# Patient Record
Sex: FEMALE | Race: WHITE | NOT HISPANIC OR LATINO | Employment: UNEMPLOYED | URBAN - NONMETROPOLITAN AREA
[De-identification: names, ages, dates, MRNs, and addresses within clinical notes are randomized per-mention and may not be internally consistent; named-entity substitution may affect disease eponyms.]

---

## 2020-09-09 ENCOUNTER — TELEPHONE (OUTPATIENT)
Dept: NEUROSURGERY | Facility: CLINIC | Age: 45
End: 2020-09-09

## 2020-09-09 NOTE — TELEPHONE ENCOUNTER
Called patient to remind her of her apt with Gabriel on 9/14/20. I left a vm with her and spoke with her emergency contact. I advised patient to bring cd of testing done at Dr. Muro office.

## 2020-09-14 ENCOUNTER — HOSPITAL ENCOUNTER (OUTPATIENT)
Dept: GENERAL RADIOLOGY | Facility: HOSPITAL | Age: 45
Discharge: HOME | End: 2020-09-14

## 2020-09-14 ENCOUNTER — OFFICE VISIT (OUTPATIENT)
Dept: NEUROSURGERY | Facility: CLINIC | Age: 45
End: 2020-09-14

## 2020-09-14 VITALS — WEIGHT: 285 LBS | HEIGHT: 68 IN | BODY MASS INDEX: 43.19 KG/M2

## 2020-09-14 DIAGNOSIS — M79.605 PAIN OF LEFT LOWER EXTREMITY: ICD-10-CM

## 2020-09-14 DIAGNOSIS — R20.2 NUMBNESS AND TINGLING OF LEFT LOWER EXTREMITY: ICD-10-CM

## 2020-09-14 DIAGNOSIS — E66.01 CLASS 3 SEVERE OBESITY DUE TO EXCESS CALORIES WITH SERIOUS COMORBIDITY AND BODY MASS INDEX (BMI) OF 40.0 TO 44.9 IN ADULT (HCC): ICD-10-CM

## 2020-09-14 DIAGNOSIS — M41.20 SCOLIOSIS (AND KYPHOSCOLIOSIS), IDIOPATHIC: ICD-10-CM

## 2020-09-14 DIAGNOSIS — M54.50 LUMBAR BACK PAIN: ICD-10-CM

## 2020-09-14 DIAGNOSIS — M41.125 ADOLESCENT IDIOPATHIC SCOLIOSIS OF THORACOLUMBAR REGION: Primary | ICD-10-CM

## 2020-09-14 DIAGNOSIS — R20.0 NUMBNESS AND TINGLING OF LEFT LOWER EXTREMITY: ICD-10-CM

## 2020-09-14 PROBLEM — E66.813 CLASS 3 SEVERE OBESITY DUE TO EXCESS CALORIES WITHOUT SERIOUS COMORBIDITY WITH BODY MASS INDEX (BMI) OF 40.0 TO 44.9 IN ADULT: Status: ACTIVE | Noted: 2020-09-14

## 2020-09-14 PROCEDURE — 99204 OFFICE O/P NEW MOD 45 MIN: CPT | Performed by: NURSE PRACTITIONER

## 2020-09-14 PROCEDURE — 72082 X-RAY EXAM ENTIRE SPI 2/3 VW: CPT

## 2020-09-14 PROCEDURE — 72114 X-RAY EXAM L-S SPINE BENDING: CPT

## 2020-09-14 RX ORDER — MELOXICAM 15 MG/1
15 TABLET ORAL DAILY
COMMUNITY
End: 2020-12-30

## 2020-09-14 RX ORDER — FLUTICASONE PROPIONATE 50 MCG
2 SPRAY, SUSPENSION (ML) NASAL DAILY PRN
COMMUNITY

## 2020-09-14 RX ORDER — HYDROCODONE BITARTRATE AND ACETAMINOPHEN 7.5; 325 MG/1; MG/1
TABLET ORAL EVERY 8 HOURS PRN
COMMUNITY
End: 2023-03-01

## 2020-09-14 NOTE — PROGRESS NOTES
Primary Care Provider: Jenniffer Reddy APRN  Requesting Provider: PHILIP Muro MD    Chief Complaint:   Chief Complaint   Patient presents with   • Back Pain     Patient is here today for back pain, left leg pain with left leg numbness and tingling into the left foot. Patient brought cd for Gabriel to review.     History of Present Illness  Consultation today at the request of PHILIP Muro MD    HPI:  Sara Foy is a 45 y.o. female who presents today with with a complaint of lumbar back and left leg pain, 90% back, 10% legs.  Diagnosed with scoliosis at age 20.  No previous spine surgeries.  No known injury.    Gradual and progressive onset of lumbar back pain since 2018.  Ms. Foy currently complains of constant left thoracolumbar back pain that intermittently radiates down the posterior aspect of the left leg to the foot, as well as intermittent numbness and tingling to the lateral leg that extends to include digits 3-5 for the left foot.  She states her back discomfort worsens with prolonged sitting, standing, walking, and with twisting.  Alleviating factors include lying in a right lateral recumbent position, forward flexion, as well as hydrocodone, Mobic, and lumbar facet injections which she obtains from Dr. Borges.  Ms. Foy denies fevers, chills, night sweats, unexplained weight loss, saddle anesthesia, or bowel or bladder dysfunction.  She currently rates the severity of her symptoms 7/10.  No additional concerns at this time.    Ms. Foy has not completed nor participated in a dedicated course of physician directed physical therapy or massage care.  Routinely evaluated by chiropractic care as well as by Dr. Borges with pain management.  Last set of injections was performed on 8/20/2020.    Oswestry Disability Index = 62%   Score   Pain Intensity Fairly severe pain-3   Personal Care I need some help but manage most of my personal care-3   Lifting Only very light weights-4   Walking Pain  prevents > 100 yards-3   Sitting Pain prevents sitting > 10 min-4   Standing Pain limits standing to < 10 min-4   Sleeping Can only sleep < 6 hrs-2   Sex Life (if applicable) Sex severely restricted by pain-4   Social Life I do not go out as often because of pain-3   Traveling Pain is bad but trips over 2 hrs-2   (Aleisha et al, 1980)    SCORE INTERPRETATION OF THE OSWESTRY LBP DISABILITY QUESTIONNAIRE     0-20% Minimal disability Can cope with most ADLs. Usually no treatment is needed, apart from advice on lifting, sitting, posture, physical fitness, and diet. In this group,  some patients have particular difficulty with sitting and this may be important if their occupation is sedentary (, , etc.)       20-40% Moderate disability This group experiences more pain and problems with sitting, lifting, and standing. Travel and social life are more difficult and they may well be off  work. Personal care, sexual activity, and sleeping are not grossly affected, and the back condition can usually be managed by conservative means.       40-60% Severe disability Pain remains the main problem in this group of patients, but travel, personal care, social life, sexual activity, and sleep are also affected.  These patients require detailed investigation.     60-80% Crippled Back pain impinges on all aspects of these patients’ lives both at home and at work. Positive intervention is required.     % These patients are either bed-bound or exaggerating their symptoms. This can be evaluated by careful observation of the patient during the  medical examination.    ROS  Review of Systems   Constitutional: Positive for activity change and fatigue.   HENT: Positive for ear pain, sinus pain and tinnitus.    Eyes: Negative.    Respiratory: Negative.    Cardiovascular: Positive for leg swelling.   Gastrointestinal: Positive for nausea.   Endocrine: Positive for cold intolerance and heat intolerance.   Genitourinary:  "Negative.    Musculoskeletal: Positive for back pain, joint swelling, neck pain and neck stiffness.   Skin: Negative.    Allergic/Immunologic: Negative.    Neurological: Positive for light-headedness, numbness and headaches.   Hematological: Negative.    Psychiatric/Behavioral: The patient is nervous/anxious.    All other systems reviewed and are negative.    Past Medical History:   Diagnosis Date   • Chronic pain    • Scoliosis      Past Surgical History:   Procedure Laterality Date   • HYSTERECTOMY  2016     Family History: family history is not on file.    Social History:  reports that she has never smoked. She has never used smokeless tobacco. She reports previous alcohol use. She reports that she does not use drugs.    Medications:    Current Outpatient Medications:   •  fluticasone (FLONASE) 50 MCG/ACT nasal spray, fluticasone propionate 50 mcg/actuation nasal spray,suspension  SHAKE LQ AND U 1 SPR IEN QD, Disp: , Rfl:   •  HYDROcodone-acetaminophen (NORCO) 7.5-325 MG per tablet, Every 8 (Eight) Hours., Disp: , Rfl:   •  meloxicam (MOBIC) 15 MG tablet, Take 15 mg by mouth Daily., Disp: , Rfl:     Allergies:  Penicillins and Contrast dye    Objective   Ht 172.7 cm (68\")   Wt 129 kg (285 lb)   BMI 43.33 kg/m²   Physical Exam  Vitals signs and nursing note reviewed.   Constitutional:       General: She is not in acute distress.     Appearance: Normal appearance. She is obese. She is not ill-appearing, toxic-appearing or diaphoretic.      Comments: BMI 43.33   HENT:      Head: Normocephalic and atraumatic.      Right Ear: Hearing normal.      Left Ear: Hearing normal.      Nose: Nose normal.      Mouth/Throat:      Lips: Pink. No lesions.      Mouth: Mucous membranes are moist.      Pharynx: No oropharyngeal exudate or posterior oropharyngeal erythema.   Eyes:      General: Lids are normal.      Extraocular Movements: Extraocular movements intact and EOM normal.      Conjunctiva/sclera: Conjunctivae normal.      " Pupils: Pupils are equal, round, and reactive to light.   Neck:      Musculoskeletal: Full passive range of motion without pain, normal range of motion and neck supple.   Cardiovascular:      Rate and Rhythm: Normal rate and regular rhythm.      Pulses: Normal pulses.   Pulmonary:      Effort: Pulmonary effort is normal.   Abdominal:      General: Abdomen is flat. There is no distension. There are no signs of injury.      Tenderness: There is no right CVA tenderness.   Musculoskeletal: Normal range of motion.      Right lower leg: No edema.      Left lower leg: No edema.   Skin:     General: Skin is warm and dry.      Capillary Refill: Capillary refill takes less than 2 seconds.   Neurological:      General: No focal deficit present.      Mental Status: She is alert and oriented to person, place, and time.      GCS: GCS eye subscore is 4. GCS verbal subscore is 5. GCS motor subscore is 6.      Gait: Gait is intact.      Deep Tendon Reflexes:      Reflex Scores:       Tricep reflexes are 2+ on the right side and 2+ on the left side.       Bicep reflexes are 2+ on the right side and 2+ on the left side.       Brachioradialis reflexes are 2+ on the right side and 2+ on the left side.       Patellar reflexes are 2+ on the right side and 2+ on the left side.       Achilles reflexes are 2+ on the right side and 2+ on the left side.  Psychiatric:         Attention and Perception: Attention normal.         Mood and Affect: Mood normal.         Speech: Speech normal.         Behavior: Behavior normal. Behavior is cooperative.         Thought Content: Thought content normal.       Neurologic Exam     Mental Status   Oriented to person, place, and time.   Attention: normal. Concentration: normal.   Speech: speech is normal   Level of consciousness: alert    Cranial Nerves     CN II   Visual fields full to confrontation.     CN III, IV, VI   Pupils are equal, round, and reactive to light.  Extraocular motions are normal.     CN  V   Facial sensation intact.     CN VII   Facial expression full, symmetric.     CN VIII   CN VIII normal.     CN IX, X   CN IX normal.     CN XI   CN XI normal.     Motor Exam   Muscle bulk: normal  Overall muscle tone: normal  Right arm tone: normal  Left arm tone: normal  Right arm pronator drift: absent  Left arm pronator drift: absent  Right leg tone: normal  Left leg tone: normal    Strength   Right deltoid: 5/5  Left deltoid: 5/5  Right biceps: 5/5  Left biceps: 5/5  Right triceps: 5/5  Left triceps: 5/5  Right wrist extension: 5/5  Left wrist extension: 5/5  Right iliopsoas: 5/5  Left iliopsoas: 5/5  Right quadriceps: 5/5  Left quadriceps: 5/5  Right anterior tibial: 5/5  Left anterior tibial: 5/5  Right posterior tibial: 5/5  Left posterior tibial: 5/5    Sensory Exam   Light touch normal.     Gait, Coordination, and Reflexes     Gait  Gait: normal    Tremor   Resting tremor: absent  Intention tremor: absent  Action tremor: absent    Reflexes   Right brachioradialis: 2+  Left brachioradialis: 2+  Right biceps: 2+  Left biceps: 2+  Right triceps: 2+  Left triceps: 2+  Right patellar: 2+  Left patellar: 2+  Right achilles: 2+  Left achilles: 2+  Right : 4+  Left : 4+  Right plantar: normal  Left plantar: normal  Right Rankin: absent  Left Rankin: absent  Right ankle clonus: absent  Left ankle clonus: absent  Right pendular knee jerk: absent  Left pendular knee jerk: absent    Imaging: (independent review and interpretation)  6/15/2020.  X-rays of the cervical spine show reversal of the normal cervical lordosis, no acute fractures or listhesis, multilevel degenerative changes.      6/18/2020 MRI of the cervical spine shows a subtle reversal of the normal cervical lordosis, no acute fractures, listhesis, or T2 signal change within the central cord, multilevel degenerative changes resulting in thecal sac compression that appears worse at C5-6; no significant central canal or foraminal stenosis noted  within the cervical spine.      8/24/2020       ASSESSMENT and PLAN  Sara Foy is a 45 y.o. female with a significant medical history of untreated idiopathic scoliosis and morbid obesity.  She presents with a new problem of left thoracolumbar back and left leg pain in the L5 distribution, 90% back, 10% left leg. DERRICK: 62.  Physical exam findings of BMI 43.33, otherwise neurologically intact.  Her imaging: MRI of the cervical spine shows multilevel degenerative changes resulting in areas of thecal sac compression without significant central canal or foraminal stenosis.  AP scoliosis imaging shows a single major thoracolumbar curvature to the left.    TREATMENT RECOMMENDATIONS ...  Idiopathic scoliosis  For further evaluation, we will proceed today by obtaining AP and lateral scoliosis imaging.    Lumbar back and left leg pain  Intermittent numbness and tingling to the left lower extremity  Differential diagnosis include, but are not limited to spondylolisthesis with concern for mechanical instability, degenerative facet arthropathy, degenerative lumbar stenosis, lumbar stenosis with neurogenic claudication and Lumbar stenosis with Left lower extremity radiculopathy.     For further evaluation will proceed today by obtaining x-rays of the lumbar spine complete with flexion-extension to assess for areas of instability.  As a means of first-line conservative management for lumbar back pain we will proceed by sending Ms. Foy for a dedicated course of physician directed physical therapy, prescription provided to patient.  I recommended she continue chiropractic and/or massage care as tolerated.  I additionally recommended she continue her current pain medication regimen per Dr. Borges.  B/R/AE and use discussed.  Return for reassessment after completion of physical therapy.  I advised the patient to call to return sooner for new or worsening complaints of weakness, paresthesias, gait disturbances, or any additional  concerns.  Treatment options discussed in detail with Sara and she voiced understanding.  Ms. Foy agrees with this plan of care.    Obese Class III extreme obesity: > or equal to 40kg/m2  Body mass index is 43.33 kg/m².  Information on the DASH diet provided in the AVS.  We will continue to provided diet and exercise information with the goal of weight loss at each scheduled appointment.  Additionally, we will send Ms. Foy to bariatric surgeon, Dr. Caraballo, for further evaluation and care of weight loss management.    Sara was seen today for back pain.    Diagnoses and all orders for this visit:    Adolescent idiopathic scoliosis of thoracolumbar region  -     XR spine scoliosis 2 or 3 views; Future    Lumbar back pain  -     XR Spine Lumbar Complete With Flex & Ext; Future  -     Ambulatory Referral to Physical Therapy Evaluate and treat (3X A WEEK FOR 6 WEEKS); Stretching, ROM, Strengthening    Pain of left lower extremity    Numbness and tingling of left lower extremity    Class 3 severe obesity due to excess calories with serious comorbidity and body mass index (BMI) of 40.0 to 44.9 in adult (CMS/[facility])  -     Ambulatory Referral to Bariatric Surgery    Other orders  -     SCANNED - IMAGING      Return for follow up with Gabriel after pt.    Thank you for this Consultation and the opportunity to participate in Sara's care.    Sincerely,  Gabriel Kaur, APRN    Level of Risk: Moderate due to: undiagnosed new problem  MDM: Moderate Complexity  (Mod = 14821, High = 14404)

## 2020-09-14 NOTE — PATIENT INSTRUCTIONS
"DASH Eating Plan  DASH stands for \"Dietary Approaches to Stop Hypertension.\" The DASH eating plan is a healthy eating plan that has been shown to reduce high blood pressure (hypertension). It may also reduce your risk for type 2 diabetes, heart disease, and stroke. The DASH eating plan may also help with weight loss.  What are tips for following this plan?    General guidelines  · Avoid eating more than 2,300 mg (milligrams) of salt (sodium) a day. If you have hypertension, you may need to reduce your sodium intake to 1,500 mg a day.  · Limit alcohol intake to no more than 1 drink a day for nonpregnant women and 2 drinks a day for men. One drink equals 12 oz of beer, 5 oz of wine, or 1½ oz of hard liquor.  · Work with your health care provider to maintain a healthy body weight or to lose weight. Ask what an ideal weight is for you.  · Get at least 30 minutes of exercise that causes your heart to beat faster (aerobic exercise) most days of the week. Activities may include walking, swimming, or biking.  · Work with your health care provider or diet and nutrition specialist (dietitian) to adjust your eating plan to your individual calorie needs.  Reading food labels    · Check food labels for the amount of sodium per serving. Choose foods with less than 5 percent of the Daily Value of sodium. Generally, foods with less than 300 mg of sodium per serving fit into this eating plan.  · To find whole grains, look for the word \"whole\" as the first word in the ingredient list.  Shopping  · Buy products labeled as \"low-sodium\" or \"no salt added.\"  · Buy fresh foods. Avoid canned foods and premade or frozen meals.  Cooking  · Avoid adding salt when cooking. Use salt-free seasonings or herbs instead of table salt or sea salt. Check with your health care provider or pharmacist before using salt substitutes.  · Do not ferrell foods. Cook foods using healthy methods such as baking, boiling, grilling, and broiling instead.  · Cook with " heart-healthy oils, such as olive, canola, soybean, or sunflower oil.  Meal planning  · Eat a balanced diet that includes:  ? 5 or more servings of fruits and vegetables each day. At each meal, try to fill half of your plate with fruits and vegetables.  ? Up to 6-8 servings of whole grains each day.  ? Less than 6 oz of lean meat, poultry, or fish each day. A 3-oz serving of meat is about the same size as a deck of cards. One egg equals 1 oz.  ? 2 servings of low-fat dairy each day.  ? A serving of nuts, seeds, or beans 5 times each week.  ? Heart-healthy fats. Healthy fats called Omega-3 fatty acids are found in foods such as flaxseeds and coldwater fish, like sardines, salmon, and mackerel.  · Limit how much you eat of the following:  ? Canned or prepackaged foods.  ? Food that is high in trans fat, such as fried foods.  ? Food that is high in saturated fat, such as fatty meat.  ? Sweets, desserts, sugary drinks, and other foods with added sugar.  ? Full-fat dairy products.  · Do not salt foods before eating.  · Try to eat at least 2 vegetarian meals each week.  · Eat more home-cooked food and less restaurant, buffet, and fast food.  · When eating at a restaurant, ask that your food be prepared with less salt or no salt, if possible.  What foods are recommended?  The items listed may not be a complete list. Talk with your dietitian about what dietary choices are best for you.  Grains  Whole-grain or whole-wheat bread. Whole-grain or whole-wheat pasta. Brown rice. Oatmeal. Quinoa. Bulgur. Whole-grain and low-sodium cereals. Leeanne bread. Low-fat, low-sodium crackers. Whole-wheat flour tortillas.  Vegetables  Fresh or frozen vegetables (raw, steamed, roasted, or grilled). Low-sodium or reduced-sodium tomato and vegetable juice. Low-sodium or reduced-sodium tomato sauce and tomato paste. Low-sodium or reduced-sodium canned vegetables.  Fruits  All fresh, dried, or frozen fruit. Canned fruit in natural juice (without  added sugar).  Meat and other protein foods  Skinless chicken or turkey. Ground chicken or turkey. Pork with fat trimmed off. Fish and seafood. Egg whites. Dried beans, peas, or lentils. Unsalted nuts, nut butters, and seeds. Unsalted canned beans. Lean cuts of beef with fat trimmed off. Low-sodium, lean deli meat.  Dairy  Low-fat (1%) or fat-free (skim) milk. Fat-free, low-fat, or reduced-fat cheeses. Nonfat, low-sodium ricotta or cottage cheese. Low-fat or nonfat yogurt. Low-fat, low-sodium cheese.  Fats and oils  Soft margarine without trans fats. Vegetable oil. Low-fat, reduced-fat, or light mayonnaise and salad dressings (reduced-sodium). Canola, safflower, olive, soybean, and sunflower oils. Avocado.  Seasoning and other foods  Herbs. Spices. Seasoning mixes without salt. Unsalted popcorn and pretzels. Fat-free sweets.  What foods are not recommended?  The items listed may not be a complete list. Talk with your dietitian about what dietary choices are best for you.  Grains  Baked goods made with fat, such as croissants, muffins, or some breads. Dry pasta or rice meal packs.  Vegetables  Creamed or fried vegetables. Vegetables in a cheese sauce. Regular canned vegetables (not low-sodium or reduced-sodium). Regular canned tomato sauce and paste (not low-sodium or reduced-sodium). Regular tomato and vegetable juice (not low-sodium or reduced-sodium). Pickles. Olives.  Fruits  Canned fruit in a light or heavy syrup. Fried fruit. Fruit in cream or butter sauce.  Meat and other protein foods  Fatty cuts of meat. Ribs. Fried meat. Hidalgo. Sausage. Bologna and other processed lunch meats. Salami. Fatback. Hotdogs. Bratwurst. Salted nuts and seeds. Canned beans with added salt. Canned or smoked fish. Whole eggs or egg yolks. Chicken or turkey with skin.  Dairy  Whole or 2% milk, cream, and half-and-half. Whole or full-fat cream cheese. Whole-fat or sweetened yogurt. Full-fat cheese. Nondairy creamers. Whipped toppings.  Processed cheese and cheese spreads.  Fats and oils  Butter. Stick margarine. Lard. Shortening. Ghee. Hidalgo fat. Tropical oils, such as coconut, palm kernel, or palm oil.  Seasoning and other foods  Salted popcorn and pretzels. Onion salt, garlic salt, seasoned salt, table salt, and sea salt. Worcestershire sauce. Tartar sauce. Barbecue sauce. Teriyaki sauce. Soy sauce, including reduced-sodium. Steak sauce. Canned and packaged gravies. Fish sauce. Oyster sauce. Cocktail sauce. Horseradish that you find on the shelf. Ketchup. Mustard. Meat flavorings and tenderizers. Bouillon cubes. Hot sauce and Tabasco sauce. Premade or packaged marinades. Premade or packaged taco seasonings. Relishes. Regular salad dressings.  Where to find more information:  · National Heart, Lung, and Blood [place]: www.nhlbi.nih.gov  · American Heart Association: www.heart.org  Summary  · The DASH eating plan is a healthy eating plan that has been shown to reduce high blood pressure (hypertension). It may also reduce your risk for type 2 diabetes, heart disease, and stroke.  · With the DASH eating plan, you should limit salt (sodium) intake to 2,300 mg a day. If you have hypertension, you may need to reduce your sodium intake to 1,500 mg a day.  · When on the DASH eating plan, aim to eat more fresh fruits and vegetables, whole grains, lean proteins, low-fat dairy, and heart-healthy fats.  · Work with your health care provider or diet and nutrition specialist (dietitian) to adjust your eating plan to your individual calorie needs.  This information is not intended to replace advice given to you by your health care provider. Make sure you discuss any questions you have with your health care provider.  Document Released: 12/06/2012 Document Revised: 11/30/2018 Document Reviewed: 12/11/2017  Elsevier Patient Education © 2020 [employer] Inc.      Smoking Tobacco Information, Adult  Smoking tobacco can be harmful to your health. Tobacco contains a  poisonous (toxic), colorless chemical called nicotine. Nicotine is addictive. It changes the brain and can make it hard to stop smoking. Tobacco also has other toxic chemicals that can hurt your body and raise your risk of many cancers.  How can smoking tobacco affect me?  Smoking tobacco puts you at risk for:  · Cancer. Smoking is most commonly associated with lung cancer, but can also lead to cancer in other parts of the body.  · Chronic obstructive pulmonary disease (COPD). This is a long-term lung condition that makes it hard to breathe. It also gets worse over time.  · High blood pressure (hypertension), heart disease, stroke, or heart attack.  · Lung infections, such as pneumonia.  · Cataracts. This is when the lenses in the eyes become clouded.  · Digestive problems. This may include peptic ulcers, heartburn, and gastroesophageal reflux disease (GERD).  · Oral health problems, such as gum disease and tooth loss.  · Loss of taste and smell.  Smoking can affect your appearance by causing:  · Wrinkles.  · Yellow or stained teeth, fingers, and fingernails.  Smoking tobacco can also affect your social life, because:  · It may be challenging to find places to smoke when away from home. Many workplaces, restaurants, hotels, and public places are tobacco-free.  · Smoking is expensive. This is due to the cost of tobacco and the long-term costs of treating health problems from smoking.  · Secondhand smoke may affect those around you. Secondhand smoke can cause lung cancer, breathing problems, and heart disease. Children of smokers have a higher risk for:  ? Sudden infant death syndrome (SIDS).  ? Ear infections.  ? Lung infections.  If you currently smoke tobacco, quitting now can help you:  · Lead a longer and healthier life.  · Look, smell, breathe, and feel better over time.  · Save money.  · Protect others from the harms of secondhand smoke.  What actions can I take to prevent health problems?  Quit smoking    · Do  not start smoking. Quit if you already do.  · Make a plan to quit smoking and commit to it. Look for programs to help you and ask your health care provider for recommendations and ideas.  · Set a date and write down all the reasons you want to quit.  · Let your friends and family know you are quitting so they can help and support you. Consider finding friends who also want to quit. It can be easier to quit with someone else, so that you can support each other.  · Talk with your health care provider about using nicotine replacement medicines to help you quit, such as gum, lozenges, patches, sprays, or pills.  · Do not replace cigarette smoking with electronic cigarettes, which are commonly called e-cigarettes. The safety of e-cigarettes is not known, and some may contain harmful chemicals.  · If you try to quit but return to smoking, stay positive. It is common to slip up when you first quit, so take it one day at a time.  · Be prepared for cravings. When you feel the urge to smoke, chew gum or suck on hard candy.  Lifestyle  · Stay busy and take care of your body.  · Drink enough fluid to keep your urine pale yellow.  · Get plenty of exercise and eat a healthy diet. This can help prevent weight gain after quitting.  · Monitor your eating habits. Quitting smoking can cause you to have a larger appetite than when you smoke.  · Find ways to relax. Go out with friends or family to a movie or a restaurant where people do not smoke.  · Ask your health care provider about having regular tests (screenings) to check for cancer. This may include blood tests, imaging tests, and other tests.  · Find ways to manage your stress, such as meditation, yoga, or exercise.  Where to find support  To get support to quit smoking, consider:  · Asking your health care provider for more information and resources.  · Taking classes to learn more about quitting smoking.  · Looking for local organizations that offer resources about quitting  "smoking.  · Joining a support group for people who want to quit smoking in your local community.  · Calling the smokefree.gov counselor helpline: 1-800-Quit-Now (1-902.410.1394)  Where to find more information  You may find more information about quitting smoking from:  · HelpGuide.org: www.helpguide.org  · Smokefree.gov: smokefree.gov  · American Lung Association: www.lung.org  Contact a health care provider if you:  · Have problems breathing.  · Notice that your lips, nose, or fingers turn blue.  · Have chest pain.  · Are coughing up blood.  · Feel faint or you pass out.  · Have other health changes that cause you to worry.  Summary  · Smoking tobacco can negatively affect your health, the health of those around you, your finances, and your social life.  · Do not start smoking. Quit if you already do. If you need help quitting, ask your health care provider.  · Think about joining a support group for people who want to quit smoking in your local community. There are many effective programs that will help you to quit this behavior.  This information is not intended to replace advice given to you by your health care provider. Make sure you discuss any questions you have with your health care provider.  Document Released: 01/02/2018 Document Revised: 02/06/2019 Document Reviewed: 01/02/2018  [employer] Patient Education © 2020 [employer]      DASH Eating Plan  DASH stands for \"Dietary Approaches to Stop Hypertension.\" The DASH eating plan is a healthy eating plan that has been shown to reduce high blood pressure (hypertension). It may also reduce your risk for type 2 diabetes, heart disease, and stroke. The DASH eating plan may also help with weight loss.  What are tips for following this plan?    General guidelines  · Avoid eating more than 2,300 mg (milligrams) of salt (sodium) a day. If you have hypertension, you may need to reduce your sodium intake to 1,500 mg a day.  · Limit alcohol intake to no more than 1 " "drink a day for nonpregnant women and 2 drinks a day for men. One drink equals 12 oz of beer, 5 oz of wine, or 1½ oz of hard liquor.  · Work with your health care provider to maintain a healthy body weight or to lose weight. Ask what an ideal weight is for you.  · Get at least 30 minutes of exercise that causes your heart to beat faster (aerobic exercise) most days of the week. Activities may include walking, swimming, or biking.  · Work with your health care provider or diet and nutrition specialist (dietitian) to adjust your eating plan to your individual calorie needs.  Reading food labels    · Check food labels for the amount of sodium per serving. Choose foods with less than 5 percent of the Daily Value of sodium. Generally, foods with less than 300 mg of sodium per serving fit into this eating plan.  · To find whole grains, look for the word \"whole\" as the first word in the ingredient list.  Shopping  · Buy products labeled as \"low-sodium\" or \"no salt added.\"  · Buy fresh foods. Avoid canned foods and premade or frozen meals.  Cooking  · Avoid adding salt when cooking. Use salt-free seasonings or herbs instead of table salt or sea salt. Check with your health care provider or pharmacist before using salt substitutes.  · Do not ferrell foods. Cook foods using healthy methods such as baking, boiling, grilling, and broiling instead.  · Cook with heart-healthy oils, such as olive, canola, soybean, or sunflower oil.  Meal planning  · Eat a balanced diet that includes:  ? 5 or more servings of fruits and vegetables each day. At each meal, try to fill half of your plate with fruits and vegetables.  ? Up to 6-8 servings of whole grains each day.  ? Less than 6 oz of lean meat, poultry, or fish each day. A 3-oz serving of meat is about the same size as a deck of cards. One egg equals 1 oz.  ? 2 servings of low-fat dairy each day.  ? A serving of nuts, seeds, or beans 5 times each week.  ? Heart-healthy fats. Healthy fats " called Omega-3 fatty acids are found in foods such as flaxseeds and coldwater fish, like sardines, salmon, and mackerel.  · Limit how much you eat of the following:  ? Canned or prepackaged foods.  ? Food that is high in trans fat, such as fried foods.  ? Food that is high in saturated fat, such as fatty meat.  ? Sweets, desserts, sugary drinks, and other foods with added sugar.  ? Full-fat dairy products.  · Do not salt foods before eating.  · Try to eat at least 2 vegetarian meals each week.  · Eat more home-cooked food and less restaurant, buffet, and fast food.  · When eating at a restaurant, ask that your food be prepared with less salt or no salt, if possible.  What foods are recommended?  The items listed may not be a complete list. Talk with your dietitian about what dietary choices are best for you.  Grains  Whole-grain or whole-wheat bread. Whole-grain or whole-wheat pasta. Brown rice. Oatmeal. Quinoa. Bulgur. Whole-grain and low-sodium cereals. Leeanne bread. Low-fat, low-sodium crackers. Whole-wheat flour tortillas.  Vegetables  Fresh or frozen vegetables (raw, steamed, roasted, or grilled). Low-sodium or reduced-sodium tomato and vegetable juice. Low-sodium or reduced-sodium tomato sauce and tomato paste. Low-sodium or reduced-sodium canned vegetables.  Fruits  All fresh, dried, or frozen fruit. Canned fruit in natural juice (without added sugar).  Meat and other protein foods  Skinless chicken or turkey. Ground chicken or turkey. Pork with fat trimmed off. Fish and seafood. Egg whites. Dried beans, peas, or lentils. Unsalted nuts, nut butters, and seeds. Unsalted canned beans. Lean cuts of beef with fat trimmed off. Low-sodium, lean deli meat.  Dairy  Low-fat (1%) or fat-free (skim) milk. Fat-free, low-fat, or reduced-fat cheeses. Nonfat, low-sodium ricotta or cottage cheese. Low-fat or nonfat yogurt. Low-fat, low-sodium cheese.  Fats and oils  Soft margarine without trans fats. Vegetable oil. Low-fat,  reduced-fat, or light mayonnaise and salad dressings (reduced-sodium). Canola, safflower, olive, soybean, and sunflower oils. Avocado.  Seasoning and other foods  Herbs. Spices. Seasoning mixes without salt. Unsalted popcorn and pretzels. Fat-free sweets.  What foods are not recommended?  The items listed may not be a complete list. Talk with your dietitian about what dietary choices are best for you.  Grains  Baked goods made with fat, such as croissants, muffins, or some breads. Dry pasta or rice meal packs.  Vegetables  Creamed or fried vegetables. Vegetables in a cheese sauce. Regular canned vegetables (not low-sodium or reduced-sodium). Regular canned tomato sauce and paste (not low-sodium or reduced-sodium). Regular tomato and vegetable juice (not low-sodium or reduced-sodium). Pickles. Olives.  Fruits  Canned fruit in a light or heavy syrup. Fried fruit. Fruit in cream or butter sauce.  Meat and other protein foods  Fatty cuts of meat. Ribs. Fried meat. Hidalgo. Sausage. Bologna and other processed lunch meats. Salami. Fatback. Hotdogs. Bratwurst. Salted nuts and seeds. Canned beans with added salt. Canned or smoked fish. Whole eggs or egg yolks. Chicken or turkey with skin.  Dairy  Whole or 2% milk, cream, and half-and-half. Whole or full-fat cream cheese. Whole-fat or sweetened yogurt. Full-fat cheese. Nondairy creamers. Whipped toppings. Processed cheese and cheese spreads.  Fats and oils  Butter. Stick margarine. Lard. Shortening. Ghee. Hidalgo fat. Tropical oils, such as coconut, palm kernel, or palm oil.  Seasoning and other foods  Salted popcorn and pretzels. Onion salt, garlic salt, seasoned salt, table salt, and sea salt. Worcestershire sauce. Tartar sauce. Barbecue sauce. Teriyaki sauce. Soy sauce, including reduced-sodium. Steak sauce. Canned and packaged gravies. Fish sauce. Oyster sauce. Cocktail sauce. Horseradish that you find on the shelf. Ketchup. Mustard. Meat flavorings and tenderizers.  Bouillon cubes. Hot sauce and Tabasco sauce. Premade or packaged marinades. Premade or packaged taco seasonings. Relishes. Regular salad dressings.  Where to find more information:  · National Heart, Lung, and Blood [place]: www.nhlbi.nih.gov  · American Heart Association: www.heart.org  Summary  · The DASH eating plan is a healthy eating plan that has been shown to reduce high blood pressure (hypertension). It may also reduce your risk for type 2 diabetes, heart disease, and stroke.  · With the DASH eating plan, you should limit salt (sodium) intake to 2,300 mg a day. If you have hypertension, you may need to reduce your sodium intake to 1,500 mg a day.  · When on the DASH eating plan, aim to eat more fresh fruits and vegetables, whole grains, lean proteins, low-fat dairy, and heart-healthy fats.  · Work with your health care provider or diet and nutrition specialist (dietitian) to adjust your eating plan to your individual calorie needs.  This information is not intended to replace advice given to you by your health care provider. Make sure you discuss any questions you have with your health care provider.  Document Released: 12/06/2012 Document Revised: 11/30/2018 Document Reviewed: 12/11/2017  Elsevier Patient Education © 2020 Else[employer] Inc.

## 2020-11-05 ENCOUNTER — TELEPHONE (OUTPATIENT)
Dept: NEUROSURGERY | Facility: CLINIC | Age: 45
End: 2020-11-05

## 2020-11-05 NOTE — TELEPHONE ENCOUNTER
LVM with patient regarding appointment reminder for 11/09/20. No notes showing patient has completed the ordered PT from her last visit. Advised via VM if she has not done this, her appointment needs to be rescheduled. Otherwise, she is to call me with the facility she completed PT. Requested call back to confirm, my direct extension provided.

## 2020-11-13 ENCOUNTER — DOCUMENTATION (OUTPATIENT)
Dept: NEUROSURGERY | Facility: CLINIC | Age: 45
End: 2020-11-13

## 2020-12-30 ENCOUNTER — OFFICE VISIT (OUTPATIENT)
Dept: NEUROSURGERY | Facility: CLINIC | Age: 45
End: 2020-12-30

## 2020-12-30 VITALS
WEIGHT: 269.6 LBS | HEIGHT: 68 IN | BODY MASS INDEX: 40.86 KG/M2 | SYSTOLIC BLOOD PRESSURE: 122 MMHG | DIASTOLIC BLOOD PRESSURE: 88 MMHG

## 2020-12-30 DIAGNOSIS — E66.01 CLASS 3 SEVERE OBESITY DUE TO EXCESS CALORIES WITH SERIOUS COMORBIDITY AND BODY MASS INDEX (BMI) OF 40.0 TO 44.9 IN ADULT (HCC): ICD-10-CM

## 2020-12-30 DIAGNOSIS — M54.6 CHRONIC MIDLINE THORACIC BACK PAIN: ICD-10-CM

## 2020-12-30 DIAGNOSIS — M54.50 LUMBAR BACK PAIN: ICD-10-CM

## 2020-12-30 DIAGNOSIS — G89.29 CHRONIC MIDLINE THORACIC BACK PAIN: ICD-10-CM

## 2020-12-30 DIAGNOSIS — R20.0 NUMBNESS AND TINGLING OF LEFT LOWER EXTREMITY: ICD-10-CM

## 2020-12-30 DIAGNOSIS — M79.605 PAIN OF LEFT LOWER EXTREMITY: ICD-10-CM

## 2020-12-30 DIAGNOSIS — M41.125 ADOLESCENT IDIOPATHIC SCOLIOSIS OF THORACOLUMBAR REGION: Primary | ICD-10-CM

## 2020-12-30 DIAGNOSIS — R20.2 NUMBNESS AND TINGLING OF LEFT LOWER EXTREMITY: ICD-10-CM

## 2020-12-30 PROCEDURE — 99214 OFFICE O/P EST MOD 30 MIN: CPT | Performed by: NURSE PRACTITIONER

## 2020-12-30 RX ORDER — FLUCONAZOLE 150 MG/1
TABLET ORAL
COMMUNITY
Start: 2020-11-07 | End: 2021-02-25

## 2020-12-30 RX ORDER — METOPROLOL SUCCINATE 25 MG/1
TABLET, EXTENDED RELEASE ORAL
COMMUNITY
End: 2021-07-09

## 2020-12-30 RX ORDER — METHOCARBAMOL 500 MG/1
TABLET, FILM COATED ORAL EVERY 12 HOURS SCHEDULED
COMMUNITY
End: 2021-02-25

## 2020-12-30 RX ORDER — CHLORTHALIDONE 25 MG/1
TABLET ORAL
COMMUNITY
End: 2023-03-01

## 2020-12-30 RX ORDER — OMEPRAZOLE 40 MG/1
40 CAPSULE, DELAYED RELEASE ORAL DAILY PRN
COMMUNITY

## 2020-12-30 RX ORDER — CLINDAMYCIN HYDROCHLORIDE 300 MG/1
CAPSULE ORAL
COMMUNITY
Start: 2020-11-07 | End: 2021-02-25

## 2021-01-04 ENCOUNTER — TELEPHONE (OUTPATIENT)
Dept: NEUROSURGERY | Facility: CLINIC | Age: 46
End: 2021-01-04

## 2021-01-04 NOTE — TELEPHONE ENCOUNTER
Caller: JACKIE ROOT    Relationship: SELF    Best call back number: [phone]    What orders are you requesting (i.e. lab or imaging): PHYSICAL THERAPY ORDER    In what timeframe would the patient need to come in: ASAP    Where will you receive your lab/imaging services: [facility] [phone])    Additional notes: THE PATIENT WAS REFERRED TO THE [facility] AND WOULD LIKE THE ORDER FAXED TO THE [facility] INSTEAD

## 2021-02-24 NOTE — PROGRESS NOTES
Name:  Sara Foy  YOB: 1975  Location: [facility]  Location Address: [address] [place] [address], [facility], [address] [place] [address]  Location Phone: [phone]    Chief Complaint  Chief Complaint   Patient presents with   • Skin Lesion     SCC  between eyebrows, been there about a year, biopsied at [facility]       History of Present Illness  The patient  is a 45 y.o. female who is referred by Tabby Ceja MD for preoperative evaluation. She complains of lesion of the glabella present for 1 year(s). It has been  biopsied.  Pathology demonstrated a squamous cell carcinoma     I have personally reviewed the information imported into the chart during this visit.      I have personally reviewed the review of systems.                   Past Medical History:   Diagnosis Date   • Chronic pain    • GERD (gastroesophageal reflux disease)    • High blood pressure    • Scoliosis    • Skin lesion        Past Surgical History:   Procedure Laterality Date   • HYSTERECTOMY  2016         Current Outpatient Medications:   •  chlorthalidone (HYGROTON) 25 MG tablet, chlorthalidone 25 mg tablet  Take 1 tablet every day by oral route., Disp: , Rfl:   •  cyclobenzaprine (FLEXERIL) 10 MG tablet, , Disp: , Rfl:   •  fluticasone (FLONASE) 50 MCG/ACT nasal spray, fluticasone propionate 50 mcg/actuation nasal spray,suspension  SHAKE LQ AND U 1 SPR IEN QD, Disp: , Rfl:   •  Furosemide (LASIX PO), furosemide, Disp: , Rfl:   •  HYDROcodone-acetaminophen (NORCO) 7.5-325 MG per tablet, Every 8 (Eight) Hours., Disp: , Rfl:   •  metoprolol succinate XL (TOPROL-XL) 25 MG 24 hr tablet, metoprolol succinate ER 25 mg tablet,extended release 24 hr  Take 1 tablet every day by oral route., Disp: , Rfl:   •  omeprazole (priLOSEC) 40 MG capsule, omeprazole 40 mg capsule,delayed release  Take 1 capsule every day by oral route before meals for 60 days., Disp: , Rfl:   •  Sucralfate (CARAFATE PO), sucralfate  takes as needed  for nausea, doesn't know strength, Disp: , Rfl:     Penicillins, Duloxetine hcl, and Contrast dye    Family History   Problem Relation Age of Onset   • No Known Problems Mother    • No Known Problems Father        Social History     Socioeconomic History   • Marital status:      Spouse name: Not on file   • Number of children: Not on file   • Years of education: Not on file   • Highest education level: Not on file   Tobacco Use   • Smoking status: Never Smoker   • Smokeless tobacco: Never Used   Substance and Sexual Activity   • Alcohol use: Yes     Comment: socially   • Drug use: Not Currently     Comment: no recent substance abuse, only in her teens   • Sexual activity: Defer       Review of Systems   Constitutional: Negative.    HENT: Negative.    Eyes: Negative.    Respiratory: Negative.    Cardiovascular: Negative.    Gastrointestinal: Negative.    Endocrine: Negative.    Genitourinary: Negative.    Musculoskeletal: Negative.    Skin:         lesion of the glabella    Allergic/Immunologic: Negative.    Neurological: Negative.    Hematological: Negative.    Psychiatric/Behavioral: Negative.        Vitals:    02/25/21 1116   BP: 129/83   Pulse: 65   Temp: 97.5 °F (36.4 °C)       Objective     Physical Exam  CONSTITUTIONAL: well nourished, well-developed, alert, oriented, in no acute distress     COMMUNICATION AND VOICE: able to communicate normally, normal voice quality    HEAD: normocephalic, no lesions, atraumatic, no tenderness, no masses     FACE: appearance normal, 13 mm RKP mid-forehead near the glabella, no tenderness, no deformities, facial motion symmetric    EYES: ocular motility normal, eyelids normal, orbits normal, no proptosis, conjunctiva normal , pupils equal, round     EARS:  Hearing: hearing to conversational voice intact bilaterally   External Ears: normal bilaterally, no lesions    NOSE:  External Nose: external nasal structure normal, no tenderness on palpation, no nasal discharge, no  lesions, no evidence of trauma, nostrils patent     ORAL:  Lips: upper and lower lips without lesion     NECK:  Inspection and Palpation: neck appearance normal, no masses or tenderness    CHEST/RESPIRATORY: normal respiratory effort     CARDIOVASCULAR: no cyanosis or edema     NEUROLOGICAL/PSYCHIATRIC: oriented to time, place and person, mood normal, affect appropriate, CN II-XII intact grossly    Assessment/Plan   Diagnoses and all orders for this visit:    1. 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 Class 3 severe obesity due to excess calories with serious comorbidity and body mass index (BMI) of 40.0 to 44.9 in adult (CMS/[facility])    Other orders  -     Follow Anesthesia Guidelines / Standing Orders; Future  -     Obtain Informed Consent  -     Follow Anesthesia Guidelines / Standing Orders; Standing  -     Verify NPO Status; Standing  -     Obtain Informed Consent; Standing  -     SCD (Sequential Compression Device) - To Be Placed on Patient in Pre-Op; Standing  -     NPO Diet; Standing      Excision of squamous cell carcinoma of the mid forehead with frozen section possible flap or graft (N/A)  Orders Placed This Encounter   Procedures   • XR Chest 2 View     Standing Status:   Future     Standing Expiration Date:   2/25/2022     Order Specific Question:   Reason for Exam:     Answer:   Excision of squamous cell carcinoma of the mid forehead with frozen section possible flap or graft     Order Specific Question:   Patient Pregnant     Answer:   Unknown   • Comprehensive Metabolic Panel     Standing Status:   Future     Standing Expiration Date:   2/25/2022   • Follow Anesthesia Guidelines / Standing Orders     Standing Status:   Future   • Obtain Informed Consent     EXCISION LESION with possible flap or graft-  "    Order Specific Question:   Informed Consent Given For     Answer:   EXCISION LESION with possible flap or graft-   • ECG 12 Lead     Standing Status:   Future     Standing Expiration Date:   2/25/2022     Order Specific Question:   Reason for Exam:     Answer:   EXCISION LESION   • CBC and Differential     Standing Status:   Future     Standing Expiration Date:   2/25/2022     Order Specific Question:   Manual Differential     Answer:   No     Return for postop.       Patient Instructions   Excision of squamous cell carcinoma of the mid forehead with frozen section and possible flap or graft    The risks, benefits and options of the procedure were explained to the patient. The possiblity of a persistent cosmetic defect as well as a \"flap\" or a graft to close the defect was discussed. The patient was instructed to stop all aspirin, NSAIDs and VIT E etc.  If appropriate, clearance with primary MD or specialist will be obtained preoperatively.  The patient was scheduled for a LOCAL/MAC Anesthesia with a frozen section for margin control.    For instructions on the proper use, care and disposal of controled substances, ask you doctor or refer to the [place] Board of Medicine website: http://kbml.[place].gov/hb1/Pages/Considerations-For-Patient-Education.aspx              "

## 2021-02-25 ENCOUNTER — OFFICE VISIT (OUTPATIENT)
Dept: OTOLARYNGOLOGY | Facility: CLINIC | Age: 46
End: 2021-02-25

## 2021-02-25 VITALS
DIASTOLIC BLOOD PRESSURE: 83 MMHG | BODY MASS INDEX: 40.07 KG/M2 | WEIGHT: 264.4 LBS | HEART RATE: 65 BPM | HEIGHT: 68 IN | TEMPERATURE: 97.5 F | SYSTOLIC BLOOD PRESSURE: 129 MMHG

## 2021-02-25 DIAGNOSIS — D04.39 SQUAMOUS CELL CARCINOMA IN SITU (SCCIS) OF SKIN OF FOREHEAD: Primary | ICD-10-CM

## 2021-02-25 DIAGNOSIS — E66.01 CLASS 3 SEVERE OBESITY DUE TO EXCESS CALORIES WITH SERIOUS COMORBIDITY AND BODY MASS INDEX (BMI) OF 40.0 TO 44.9 IN ADULT (HCC): ICD-10-CM

## 2021-02-25 PROCEDURE — 99204 OFFICE O/P NEW MOD 45 MIN: CPT | Performed by: OTOLARYNGOLOGY

## 2021-02-25 RX ORDER — CYCLOBENZAPRINE HCL 10 MG
10 TABLET ORAL 3 TIMES DAILY PRN
COMMUNITY
Start: 2021-02-19 | End: 2021-04-13

## 2021-02-25 NOTE — PATIENT INSTRUCTIONS
"Excision of squamous cell carcinoma of the mid forehead with frozen section and possible flap or graft    The risks, benefits and options of the procedure were explained to the patient. The possiblity of a persistent cosmetic defect as well as a \"flap\" or a graft to close the defect was discussed. The patient was instructed to stop all aspirin, NSAIDs and VIT E etc.  If appropriate, clearance with primary MD or specialist will be obtained preoperatively.  The patient was scheduled for a LOCAL/MAC Anesthesia with a frozen section for margin control.    For instructions on the proper use, care and disposal of controled substances, ask you doctor or refer to the [place] Board of Medicine website: http://kbml.[place].gov/hb1/Pages/Considerations-For-Patient-Education.aspx    "

## 2021-03-03 ENCOUNTER — TRANSCRIBE ORDERS (OUTPATIENT)
Dept: ADMINISTRATIVE | Facility: HOSPITAL | Age: 46
End: 2021-03-03

## 2021-03-03 DIAGNOSIS — Z01.818 PREOPERATIVE TESTING: Primary | ICD-10-CM

## 2021-03-08 ENCOUNTER — LAB (OUTPATIENT)
Dept: LAB | Facility: HOSPITAL | Age: 46
End: 2021-03-08

## 2021-03-08 ENCOUNTER — APPOINTMENT (OUTPATIENT)
Dept: PREADMISSION TESTING | Facility: HOSPITAL | Age: 46
End: 2021-03-08

## 2021-03-08 VITALS
DIASTOLIC BLOOD PRESSURE: 93 MMHG | RESPIRATION RATE: 16 BRPM | WEIGHT: 260.8 LBS | BODY MASS INDEX: 41.91 KG/M2 | OXYGEN SATURATION: 98 % | SYSTOLIC BLOOD PRESSURE: 128 MMHG | HEIGHT: 66 IN | HEART RATE: 66 BPM

## 2021-03-08 DIAGNOSIS — Z01.818 PREOPERATIVE TESTING: ICD-10-CM

## 2021-03-08 DIAGNOSIS — D04.39 SQUAMOUS CELL CARCINOMA IN SITU (SCCIS) OF SKIN OF FOREHEAD: ICD-10-CM

## 2021-03-08 LAB
ALBUMIN SERPL-MCNC: 4.1 G/DL (ref 3.5–5.2)
ALBUMIN/GLOB SERPL: 1.4 G/DL
ALP SERPL-CCNC: 63 U/L (ref 39–117)
ALT SERPL W P-5'-P-CCNC: 29 U/L (ref 1–33)
ANION GAP SERPL CALCULATED.3IONS-SCNC: 10 MMOL/L (ref 5–15)
AST SERPL-CCNC: 21 U/L (ref 1–32)
BASOPHILS # BLD AUTO: 0.05 10*3/MM3 (ref 0–0.2)
BASOPHILS NFR BLD AUTO: 0.7 % (ref 0–1.5)
BILIRUB SERPL-MCNC: 0.5 MG/DL (ref 0–1.2)
BUN SERPL-MCNC: 14 MG/DL (ref 6–20)
BUN/CREAT SERPL: 17.5 (ref 7–25)
CALCIUM SPEC-SCNC: 9.4 MG/DL (ref 8.6–10.5)
CHLORIDE SERPL-SCNC: 106 MMOL/L (ref 98–107)
CO2 SERPL-SCNC: 24 MMOL/L (ref 22–29)
CREAT SERPL-MCNC: 0.8 MG/DL (ref 0.57–1)
DEPRECATED RDW RBC AUTO: 39.2 FL (ref 37–54)
EOSINOPHIL # BLD AUTO: 0.16 10*3/MM3 (ref 0–0.4)
EOSINOPHIL NFR BLD AUTO: 2.1 % (ref 0.3–6.2)
ERYTHROCYTE [DISTWIDTH] IN BLOOD BY AUTOMATED COUNT: 12.3 % (ref 12.3–15.4)
GFR SERPL CREATININE-BSD FRML MDRD: 78 ML/MIN/1.73
GLOBULIN UR ELPH-MCNC: 2.9 GM/DL
GLUCOSE SERPL-MCNC: 101 MG/DL (ref 65–99)
HCT VFR BLD AUTO: 40.1 % (ref 34–46.6)
HGB BLD-MCNC: 14.3 G/DL (ref 12–15.9)
IMM GRANULOCYTES # BLD AUTO: 0.03 10*3/MM3 (ref 0–0.05)
IMM GRANULOCYTES NFR BLD AUTO: 0.4 % (ref 0–0.5)
LYMPHOCYTES # BLD AUTO: 2.15 10*3/MM3 (ref 0.7–3.1)
LYMPHOCYTES NFR BLD AUTO: 28.4 % (ref 19.6–45.3)
MCH RBC QN AUTO: 31.4 PG (ref 26.6–33)
MCHC RBC AUTO-ENTMCNC: 35.7 G/DL (ref 31.5–35.7)
MCV RBC AUTO: 87.9 FL (ref 79–97)
MONOCYTES # BLD AUTO: 0.57 10*3/MM3 (ref 0.1–0.9)
MONOCYTES NFR BLD AUTO: 7.5 % (ref 5–12)
NEUTROPHILS NFR BLD AUTO: 4.62 10*3/MM3 (ref 1.7–7)
NEUTROPHILS NFR BLD AUTO: 60.9 % (ref 42.7–76)
NRBC BLD AUTO-RTO: 0 /100 WBC (ref 0–0.2)
PLATELET # BLD AUTO: 321 10*3/MM3 (ref 140–450)
PMV BLD AUTO: 10.2 FL (ref 6–12)
POTASSIUM SERPL-SCNC: 4.1 MMOL/L (ref 3.5–5.2)
PROT SERPL-MCNC: 7 G/DL (ref 6–8.5)
RBC # BLD AUTO: 4.56 10*6/MM3 (ref 3.77–5.28)
SARS-COV-2 RNA RESP QL NAA+PROBE: NOT DETECTED
SODIUM SERPL-SCNC: 140 MMOL/L (ref 136–145)
WBC # BLD AUTO: 7.58 10*3/MM3 (ref 3.4–10.8)

## 2021-03-08 PROCEDURE — C9803 HOPD COVID-19 SPEC COLLECT: HCPCS

## 2021-03-08 PROCEDURE — 85025 COMPLETE CBC W/AUTO DIFF WBC: CPT

## 2021-03-08 PROCEDURE — 93010 ELECTROCARDIOGRAM REPORT: CPT | Performed by: INTERNAL MEDICINE

## 2021-03-08 PROCEDURE — 93005 ELECTROCARDIOGRAM TRACING: CPT

## 2021-03-08 PROCEDURE — 36415 COLL VENOUS BLD VENIPUNCTURE: CPT

## 2021-03-08 PROCEDURE — 80053 COMPREHEN METABOLIC PANEL: CPT

## 2021-03-08 PROCEDURE — U0003 INFECTIOUS AGENT DETECTION BY NUCLEIC ACID (DNA OR RNA); SEVERE ACUTE RESPIRATORY SYNDROME CORONAVIRUS 2 (SARS-COV-2) (CORONAVIRUS DISEASE [COVID-19]), AMPLIFIED PROBE TECHNIQUE, MAKING USE OF HIGH THROUGHPUT TECHNOLOGIES AS DESCRIBED BY CMS-2020-01-R: HCPCS

## 2021-03-08 NOTE — DISCHARGE INSTRUCTIONS
DAY OF SURGERY INSTRUCTIONS        YOUR SURGEON: Dr. Andrea Ceja    PROCEDURE: Excision of squamous cell carcinoma of the mid forehead with frozen section and possible flap or graft    DATE OF SURGERY: Wednesday March 10, 2021    ARRIVAL TIME: AS DIRECTED BY OFFICE    YOU MAY TAKE THE FOLLOWING MEDICATION(S) THE MORNING OF SURGERY WITH A SIP OF WATER: Metoprolol (TOPROL XL), Hydrocodone/Acetaminophen (NORCO) if needed for pain    ALL OTHER HOME MEDICATIONS CHECK WITH YOUR DOCTOR      MANAGING PAIN AFTER SURGERY    We know you are probably wondering what your pain will be like after surgery.  Following surgery it is unrealistic to expect you will not have pain.   Pain is how our bodies let us know that something is wrong or cautions us to be careful.  That said, our goal is to make your pain tolerable.    Methods we may use to treat your pain include (oral or IV medications, PCAs, epidurals, nerve blocks, etc.)   While some procedures require IV pain medications for a short time after surgery, transitioning to pain medications by mouth allows for better management of pain.   Your nurse will encourage you to take oral pain medications whenever possible.  IV medications work almost immediately, but only last a short while.  Taking medications by mouth allows for a more constant level of medication in your blood stream for a longer period of time.      Once your pain is out of control it is harder to get back under control.  It is important you are aware when your next dose of pain medication is due.  If you are admitted, your nurse may write the time of your next dose on the white board in your room to help you remember.      We are interested in your pain and encourage you to inform us about aggravating factors during your visit.   Many times a simple repositioning every few hours can make a big difference.    If your physician says it is okay, do not let your pain prevent you from getting out of bed. Be sure to call  your nurse for assistance prior to getting up so you do not fall.      Before surgery, please decide your tolerable pain goal.  These faces help describe the pain ratings we use on a 0-10 scale.   Be prepared to tell us your goal and whether or not you take pain or anxiety medications at home.      BEFORE YOU COME TO THE HOSPITAL  (Pre-op instructions)  • Do not eat, drink, smoke or chew gum after midnight the night before surgery.  This also includes no mints.  • Morning of surgery take only the medicines you have been instructed with a sip of water unless otherwise instructed  by your physician.  • Do not shave, wear makeup or dark nail polish.  • Remove all jewelry including rings.  • Leave anything you consider valuable at home.  • Leave your suitcase in the car until after your surgery.  • Bring the following with you if applicable:  o Picture ID and insurance, Medicare or Medicaid cards  o Co-pay/deductible required by insurance (cash, check, credit card)  o Copy of advance directive, living will or power-of- documents if not brought to PAT  o CPAP or BIPAP mask and tubing  o Relaxation aids ( book, magazine), etc.  o Hearing aids                                 ON THE DAY OF SURGERY  · On the day of surgery check in at registration located at the main entrance of the hospital.   ? You will be registered and given a beeper with instructions where to wait in the main lobby.  ? When your beeper lights up and vibrates a member of the Outpatient Surgery staff will meet you at the double doors under the stair steps and escort you to your preoperative room.   · You may have cloth compression devices placed on your legs. These help to prevent blood clots and reduce swelling in your legs.  · An IV may be inserted into one of your veins.  · In the operating room, you may be given one or more of the following:  ? A medicine to help you relax (sedative).  ? A medicine to numb the area (local anesthetic).  ? A  "medicine to make you fall asleep (general anesthetic).  ? A medicine that is injected into an area of your body to numb everything below the injection site (regional anesthetic).  · Your surgical site will be marked or identified.  · You may be given an antibiotic through your IV to help prevent infection.  Contact a health care provider if you:  · Develop a fever of more than 100.4°F (38°C) or other feelings of illness during the 48 hours before your surgery.  · Have symptoms that get worse.  Have questions or concerns about your surgery    General Anesthesia/Surgery, Adult  General anesthesia is the use of medicines to make a person \"go to sleep\" (unconscious) for a medical procedure. General anesthesia must be used for certain procedures, and is often recommended for procedures that:  · Last a long time.  · Require you to be still or in an unusual position.  · Are major and can cause blood loss.  The medicines used for general anesthesia are called general anesthetics. As well as making you unconscious for a certain amount of time, these medicines:  · Prevent pain.  · Control your blood pressure.  · Relax your muscles.  Tell a health care provider about:  · Any allergies you have.  · All medicines you are taking, including vitamins, herbs, eye drops, creams, and over-the-counter medicines.  · Any problems you or family members have had with anesthetic medicines.  · Types of anesthetics you have had in the past.  · Any blood disorders you have.  · Any surgeries you have had.  · Any medical conditions you have.  · Any recent upper respiratory, chest, or ear infections.  · Any history of:  ? Heart or lung conditions, such as heart failure, sleep apnea, asthma, or chronic obstructive pulmonary disease (COPD).  ?  service.  ? Depression or anxiety.  · Any tobacco or drug use, including marijuana or alcohol use.  · Whether you are pregnant or may be pregnant.  What are the risks?  Generally, this is a safe " procedure. However, problems may occur, including:  · Allergic reaction.  · Lung and heart problems.  · Inhaling food or liquid from the stomach into the lungs (aspiration).  · Nerve injury.  · Air in the bloodstream, which can lead to stroke.  · Extreme agitation or confusion (delirium) when you wake up from the anesthetic.  · Waking up during your procedure and being unable to move. This is rare.  These problems are more likely to develop if you are having a major surgery or if you have an advanced or serious medical condition. You can prevent some of these complications by answering all of your health care provider's questions thoroughly and by following all instructions before your procedure.  General anesthesia can cause side effects, including:  · Nausea or vomiting.  · A sore throat from the breathing tube.  · Hoarseness.  · Wheezing or coughing.  · Shaking chills.  · Tiredness.  · Body aches.  · Anxiety.  · Sleepiness or drowsiness.  · Confusion or agitation.  RISKS AND COMPLICATIONS OF SURGERY  Your health care provider will discuss possible risks and complications with you before surgery. Common risks and complications include:    · Problems due to the use of anesthetics.  · Blood loss and replacement (does not apply to minor surgical procedures).  · Temporary increase in pain due to surgery.  · Uncorrected pain or problems that the surgery was meant to correct.  · Infection.  · New damage.    What happens before the procedure?    Medicines  Ask your health care provider about:  · Changing or stopping your regular medicines. This is especially important if you are taking diabetes medicines or blood thinners.  · Taking medicines such as aspirin and ibuprofen. These medicines can thin your blood. Do not take these medicines unless your health care provider tells you to take them.  · Taking over-the-counter medicines, vitamins, herbs, and supplements. Do not take these during the week before your procedure  unless your health care provider approves them.  General instructions  · Starting 3-6 weeks before the procedure, do not use any products that contain nicotine or tobacco, such as cigarettes and e-cigarettes. If you need help quitting, ask your health care provider.  · If you brush your teeth on the morning of the procedure, make sure to spit out all of the toothpaste.  · Tell your health care provider if you become ill or develop a cold, cough, or fever.  · If instructed by your health care provider, bring your sleep apnea device with you on the day of your surgery (if applicable).  · Ask your health care provider if you will be going home the same day, the following day, or after a longer hospital stay.  ? Plan to have someone take you home from the hospital or clinic.  ? Plan to have a responsible adult care for you for at least 24 hours after you leave the hospital or clinic. This is important.  What happens during the procedure?  · You will be given anesthetics through both of the following:  ? A mask placed over your nose and mouth.  ? An IV in one of your veins.  · You may receive a medicine to help you relax (sedative).  · After you are unconscious, a breathing tube may be inserted down your throat to help you breathe. This will be removed before you wake up.  · An anesthesia specialist will stay with you throughout your procedure. He or she will:  ? Keep you comfortable and safe by continuing to give you medicines and adjusting the amount of medicine that you get.  ? Monitor your blood pressure, pulse, and oxygen levels to make sure that the anesthetics do not cause any problems.  The procedure may vary among health care providers and hospitals.  What happens after the procedure?  · Your blood pressure, temperature, heart rate, breathing rate, and blood oxygen level will be monitored until the medicines you were given have worn off.  · You will wake up in a recovery area. You may wake up slowly.  · If you  feel anxious or agitated, you may be given medicine to help you calm down.  · If you will be going home the same day, your health care provider may check to make sure you can walk, drink, and urinate.  · Your health care provider will treat any pain or side effects you have before you go home.  · Do not drive for 24 hours if you were given a sedative.  Summary  · General anesthesia is used to keep you still and prevent pain during a procedure.  · It is important to tell your healthcare provider about your medical history and any surgeries you have had, and previous experience with anesthesia.  · Follow your healthcare provider’s instructions about when to stop eating, drinking, or taking certain medicines before your procedure.  · Plan to have someone take you home from the hospital or clinic.  This information is not intended to replace advice given to you by your health care provider. Make sure you discuss any questions you have with your health care provider.  Document Released: 03/26/2009 Document Revised: 08/03/2018 Document Reviewed: 08/03/2018  [employer] Interactive Patient Education © 2019 [employer]      Fall Prevention in Hospitals, Adult  As a hospital patient, your condition and the treatments you receive can increase your risk for falls. Some additional risk factors for falls in a hospital include:  · Being in an unfamiliar environment.  · Being on bed rest.  · Your surgery.  · Taking certain medicines.  · Your tubing requirements, such as intravenous (IV) therapy or catheters.  It is important that you learn how to decrease fall risks while at the hospital. Below are important tips that can help prevent falls.  SAFETY TIPS FOR PREVENTING FALLS  Talk about your risk of falling.  · Ask your health care provider why you are at risk for falling. Is it your medicine, illness, tubing placement, or something else?  · Make a plan with your health care provider to keep you safe from falls.  · Ask your health  care provider or pharmacist about side effects of your medicines. Some medicines can make you dizzy or affect your coordination.  Ask for help.  · Ask for help before getting out of bed. You may need to press your call button.  · Ask for assistance in getting safely to the toilet.  · Ask for a walker or cane to be put at your bedside. Ask that most of the side rails on your bed be placed up before your health care provider leaves the room.  · Ask family or friends to sit with you.  · Ask for things that are out of your reach, such as your glasses, hearing aids, telephone, bedside table, or call button.  Follow these tips to avoid falling:  · Stay lying or seated, rather than standing, while waiting for help.  · Wear rubber-soled slippers or shoes whenever you walk in the hospital.  · Avoid quick, sudden movements.  ¨ Change positions slowly.  ¨ Sit on the side of your bed before standing.  ¨ Stand up slowly and wait before you start to walk.  · Let your health care provider know if there is a spill on the floor.  · Pay careful attention to the medical equipment, electrical cords, and tubes around you.  · When you need help, use your call button by your bed or in the bathroom. Wait for one of your health care providers to help you.  · If you feel dizzy or unsure of your footing, return to bed and wait for assistance.  · Avoid being distracted by the TV, telephone, or another person in your room.  · Do not lean or support yourself on rolling objects, such as IV poles or bedside tables.     This information is not intended to replace advice given to you by your health care provider. Make sure you discuss any questions you have with your health care provider.     Document Released: 12/15/2001 Document Revised: 01/08/2016 Document Reviewed: 08/25/2013  [employer]vier Interactive Patient Education ©2016 [employer]            PATIENT/FAMILY/RESPONSIBLE PARTY VERBALIZES UNDERSTANDING OF ABOVE EDUCATION.  COPY OF PAIN SCALE  GIVEN AND REVIEWED WITH VERBALIZED UNDERSTANDING.

## 2021-03-09 LAB
QT INTERVAL: 446 MS
QTC INTERVAL: 426 MS

## 2021-03-10 ENCOUNTER — HOSPITAL ENCOUNTER (OUTPATIENT)
Dept: GENERAL RADIOLOGY | Facility: HOSPITAL | Age: 46
Setting detail: HOSPITAL OUTPATIENT SURGERY
Discharge: HOME | End: 2021-03-10

## 2021-03-10 ENCOUNTER — HOSPITAL ENCOUNTER (OUTPATIENT)
Facility: HOSPITAL | Age: 46
Setting detail: HOSPITAL OUTPATIENT SURGERY
Discharge: HOME | End: 2021-03-10
Attending: OTOLARYNGOLOGY | Admitting: OTOLARYNGOLOGY

## 2021-03-10 ENCOUNTER — ANESTHESIA (OUTPATIENT)
Dept: PERIOP | Facility: HOSPITAL | Age: 46
End: 2021-03-10

## 2021-03-10 ENCOUNTER — ANESTHESIA EVENT (OUTPATIENT)
Dept: PERIOP | Facility: HOSPITAL | Age: 46
End: 2021-03-10

## 2021-03-10 VITALS
TEMPERATURE: 98.2 F | RESPIRATION RATE: 16 BRPM | DIASTOLIC BLOOD PRESSURE: 69 MMHG | SYSTOLIC BLOOD PRESSURE: 104 MMHG | HEART RATE: 55 BPM | OXYGEN SATURATION: 99 %

## 2021-03-10 DIAGNOSIS — D04.39 SQUAMOUS CELL CARCINOMA IN SITU (SCCIS) OF SKIN OF FOREHEAD: ICD-10-CM

## 2021-03-10 PROCEDURE — 88305 TISSUE EXAM BY PATHOLOGIST: CPT | Performed by: OTOLARYNGOLOGY

## 2021-03-10 PROCEDURE — 25010000002 ONDANSETRON PER 1 MG: Performed by: NURSE ANESTHETIST, CERTIFIED REGISTERED

## 2021-03-10 PROCEDURE — 14040 TIS TRNFR F/C/C/M/N/A/G/H/F: CPT | Performed by: OTOLARYNGOLOGY

## 2021-03-10 PROCEDURE — 25010000002 DEXAMETHASONE PER 1 MG: Performed by: NURSE ANESTHETIST, CERTIFIED REGISTERED

## 2021-03-10 PROCEDURE — 71046 X-RAY EXAM CHEST 2 VIEWS: CPT

## 2021-03-10 PROCEDURE — 25010000002 MIDAZOLAM PER 1 MG: Performed by: ANESTHESIOLOGY

## 2021-03-10 PROCEDURE — 25010000002 PROPOFOL 10 MG/ML EMULSION: Performed by: NURSE ANESTHETIST, CERTIFIED REGISTERED

## 2021-03-10 PROCEDURE — 25010000002 FENTANYL CITRATE (PF) 100 MCG/2ML SOLUTION: Performed by: NURSE ANESTHETIST, CERTIFIED REGISTERED

## 2021-03-10 PROCEDURE — 88331 PATH CONSLTJ SURG 1 BLK 1SPC: CPT | Performed by: PATHOLOGY

## 2021-03-10 RX ORDER — ONDANSETRON 2 MG/ML
INJECTION INTRAMUSCULAR; INTRAVENOUS AS NEEDED
Status: DISCONTINUED | OUTPATIENT
Start: 2021-03-10 | End: 2021-03-10 | Stop reason: SURG

## 2021-03-10 RX ORDER — FENTANYL CITRATE 50 UG/ML
INJECTION, SOLUTION INTRAMUSCULAR; INTRAVENOUS AS NEEDED
Status: DISCONTINUED | OUTPATIENT
Start: 2021-03-10 | End: 2021-03-10 | Stop reason: SURG

## 2021-03-10 RX ORDER — SODIUM CHLORIDE, SODIUM LACTATE, POTASSIUM CHLORIDE, CALCIUM CHLORIDE 600; 310; 30; 20 MG/100ML; MG/100ML; MG/100ML; MG/100ML
1000 INJECTION, SOLUTION INTRAVENOUS CONTINUOUS
Status: DISCONTINUED | OUTPATIENT
Start: 2021-03-10 | End: 2021-03-10 | Stop reason: HOSPADM

## 2021-03-10 RX ORDER — DEXTROSE MONOHYDRATE 25 G/50ML
12.5 INJECTION, SOLUTION INTRAVENOUS AS NEEDED
Status: DISCONTINUED | OUTPATIENT
Start: 2021-03-10 | End: 2021-03-10 | Stop reason: HOSPADM

## 2021-03-10 RX ORDER — FENTANYL CITRATE 50 UG/ML
25 INJECTION, SOLUTION INTRAMUSCULAR; INTRAVENOUS
Status: DISCONTINUED | OUTPATIENT
Start: 2021-03-10 | End: 2021-03-10 | Stop reason: HOSPADM

## 2021-03-10 RX ORDER — LIDOCAINE HYDROCHLORIDE 10 MG/ML
0.5 INJECTION, SOLUTION EPIDURAL; INFILTRATION; INTRACAUDAL; PERINEURAL ONCE AS NEEDED
Status: DISCONTINUED | OUTPATIENT
Start: 2021-03-10 | End: 2021-03-10 | Stop reason: HOSPADM

## 2021-03-10 RX ORDER — DEXAMETHASONE SODIUM PHOSPHATE 4 MG/ML
INJECTION, SOLUTION INTRA-ARTICULAR; INTRALESIONAL; INTRAMUSCULAR; INTRAVENOUS; SOFT TISSUE AS NEEDED
Status: DISCONTINUED | OUTPATIENT
Start: 2021-03-10 | End: 2021-03-10 | Stop reason: SURG

## 2021-03-10 RX ORDER — OXYCODONE AND ACETAMINOPHEN 10; 325 MG/1; MG/1
1 TABLET ORAL ONCE AS NEEDED
Status: CANCELLED | OUTPATIENT
Start: 2021-03-10

## 2021-03-10 RX ORDER — LABETALOL HYDROCHLORIDE 5 MG/ML
5 INJECTION, SOLUTION INTRAVENOUS
Status: CANCELLED | OUTPATIENT
Start: 2021-03-10

## 2021-03-10 RX ORDER — LIDOCAINE HYDROCHLORIDE 20 MG/ML
INJECTION, SOLUTION EPIDURAL; INFILTRATION; INTRACAUDAL; PERINEURAL AS NEEDED
Status: DISCONTINUED | OUTPATIENT
Start: 2021-03-10 | End: 2021-03-10 | Stop reason: SURG

## 2021-03-10 RX ORDER — IBUPROFEN 600 MG/1
600 TABLET ORAL ONCE AS NEEDED
Status: CANCELLED | OUTPATIENT
Start: 2021-03-10

## 2021-03-10 RX ORDER — SODIUM CHLORIDE, SODIUM LACTATE, POTASSIUM CHLORIDE, CALCIUM CHLORIDE 600; 310; 30; 20 MG/100ML; MG/100ML; MG/100ML; MG/100ML
9 INJECTION, SOLUTION INTRAVENOUS CONTINUOUS
Status: DISCONTINUED | OUTPATIENT
Start: 2021-03-10 | End: 2021-03-10 | Stop reason: HOSPADM

## 2021-03-10 RX ORDER — ONDANSETRON 2 MG/ML
4 INJECTION INTRAMUSCULAR; INTRAVENOUS ONCE AS NEEDED
Status: CANCELLED | OUTPATIENT
Start: 2021-03-10

## 2021-03-10 RX ORDER — SODIUM CHLORIDE 0.9 % (FLUSH) 0.9 %
3 SYRINGE (ML) INJECTION AS NEEDED
Status: DISCONTINUED | OUTPATIENT
Start: 2021-03-10 | End: 2021-03-10 | Stop reason: HOSPADM

## 2021-03-10 RX ORDER — MIDAZOLAM HYDROCHLORIDE 1 MG/ML
2 INJECTION INTRAMUSCULAR; INTRAVENOUS
Status: DISCONTINUED | OUTPATIENT
Start: 2021-03-10 | End: 2021-03-10 | Stop reason: HOSPADM

## 2021-03-10 RX ORDER — NALOXONE HCL 0.4 MG/ML
0.4 VIAL (ML) INJECTION AS NEEDED
Status: CANCELLED | OUTPATIENT
Start: 2021-03-10

## 2021-03-10 RX ORDER — FENTANYL CITRATE 50 UG/ML
25 INJECTION, SOLUTION INTRAMUSCULAR; INTRAVENOUS
Status: CANCELLED | OUTPATIENT
Start: 2021-03-10

## 2021-03-10 RX ORDER — HYDROCODONE BITARTRATE AND ACETAMINOPHEN 5; 325 MG/1; MG/1
1 TABLET ORAL ONCE AS NEEDED
Status: DISCONTINUED | OUTPATIENT
Start: 2021-03-10 | End: 2021-03-10 | Stop reason: HOSPADM

## 2021-03-10 RX ORDER — OXYCODONE AND ACETAMINOPHEN 7.5; 325 MG/1; MG/1
2 TABLET ORAL EVERY 4 HOURS PRN
Status: CANCELLED | OUTPATIENT
Start: 2021-03-10 | End: 2021-03-20

## 2021-03-10 RX ORDER — FLUMAZENIL 0.1 MG/ML
0.2 INJECTION INTRAVENOUS AS NEEDED
Status: CANCELLED | OUTPATIENT
Start: 2021-03-10

## 2021-03-10 RX ORDER — MIDAZOLAM HYDROCHLORIDE 1 MG/ML
1 INJECTION INTRAMUSCULAR; INTRAVENOUS
Status: DISCONTINUED | OUTPATIENT
Start: 2021-03-10 | End: 2021-03-10 | Stop reason: HOSPADM

## 2021-03-10 RX ORDER — SODIUM CHLORIDE 0.9 % (FLUSH) 0.9 %
10 SYRINGE (ML) INJECTION AS NEEDED
Status: DISCONTINUED | OUTPATIENT
Start: 2021-03-10 | End: 2021-03-10 | Stop reason: HOSPADM

## 2021-03-10 RX ORDER — LIDOCAINE HYDROCHLORIDE AND EPINEPHRINE 10; 10 MG/ML; UG/ML
INJECTION, SOLUTION INFILTRATION; PERINEURAL AS NEEDED
Status: DISCONTINUED | OUTPATIENT
Start: 2021-03-10 | End: 2021-03-10 | Stop reason: HOSPADM

## 2021-03-10 RX ORDER — PROPOFOL 10 MG/ML
VIAL (ML) INTRAVENOUS AS NEEDED
Status: DISCONTINUED | OUTPATIENT
Start: 2021-03-10 | End: 2021-03-10 | Stop reason: SURG

## 2021-03-10 RX ORDER — MAGNESIUM HYDROXIDE 1200 MG/15ML
LIQUID ORAL AS NEEDED
Status: DISCONTINUED | OUTPATIENT
Start: 2021-03-10 | End: 2021-03-10 | Stop reason: HOSPADM

## 2021-03-10 RX ORDER — SODIUM CHLORIDE 0.9 % (FLUSH) 0.9 %
10 SYRINGE (ML) INJECTION EVERY 12 HOURS SCHEDULED
Status: DISCONTINUED | OUTPATIENT
Start: 2021-03-10 | End: 2021-03-10 | Stop reason: HOSPADM

## 2021-03-10 RX ORDER — ONDANSETRON 4 MG/1
4 TABLET, FILM COATED ORAL ONCE AS NEEDED
Status: DISCONTINUED | OUTPATIENT
Start: 2021-03-10 | End: 2021-03-10 | Stop reason: HOSPADM

## 2021-03-10 RX ADMIN — DEXAMETHASONE SODIUM PHOSPHATE 8 MG: 4 INJECTION, SOLUTION INTRAMUSCULAR; INTRAVENOUS at 10:45

## 2021-03-10 RX ADMIN — PROPOFOL 150 MCG/KG/MIN: 10 INJECTION, EMULSION INTRAVENOUS at 10:39

## 2021-03-10 RX ADMIN — MIDAZOLAM 2 MG: 1 INJECTION INTRAMUSCULAR; INTRAVENOUS at 10:20

## 2021-03-10 RX ADMIN — FENTANYL CITRATE 25 MCG: 50 INJECTION, SOLUTION INTRAMUSCULAR; INTRAVENOUS at 11:02

## 2021-03-10 RX ADMIN — ONDANSETRON HYDROCHLORIDE 4 MG: 2 SOLUTION INTRAMUSCULAR; INTRAVENOUS at 11:26

## 2021-03-10 RX ADMIN — HYDROCODONE BITARTRATE AND ACETAMINOPHEN 1 TABLET: 5; 325 TABLET ORAL at 12:31

## 2021-03-10 RX ADMIN — PROPOFOL 150 MG: 10 INJECTION, EMULSION INTRAVENOUS at 10:41

## 2021-03-10 RX ADMIN — SODIUM CHLORIDE, POTASSIUM CHLORIDE, SODIUM LACTATE AND CALCIUM CHLORIDE 1000 ML: 600; 310; 30; 20 INJECTION, SOLUTION INTRAVENOUS at 07:57

## 2021-03-10 RX ADMIN — LIDOCAINE HYDROCHLORIDE 100 MG: 20 INJECTION, SOLUTION EPIDURAL; INFILTRATION; INTRACAUDAL; PERINEURAL at 10:41

## 2021-03-10 RX ADMIN — FENTANYL CITRATE 50 MCG: 50 INJECTION, SOLUTION INTRAMUSCULAR; INTRAVENOUS at 10:38

## 2021-03-10 NOTE — OP NOTE
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    Approximately 5mL 1% Xylocaine with epinephrine was injected in the planned excision site in the glabellar region of the forehead.  Excision was accomplished with a #15 blade in an oblong fashion without difficulty.  The excision was approximately 1.8 cm  x 1.6 cm.  The lesion was approximately 1.4 cm x 1.0 cm.  The margin was 2 mm. Upon excision the specimen was marked and submitted for frozen section examination which demonstrated findings consistent with a squamous cell carcinoma in situ with margins free of involvement with tumor.    A laterally based transposition flap was fashioned and wide undermining performed with curved iris scissors and double prong skin hooks.  Minimal bleeding was encountered which was controlled with electrocautery and low settings.  The flap was created to preserve normal anatomic relationships and to facilitate closure.  The flap was transposed and the donor site as well as the flap sutured in place utilizing interrupted 4-0 Monocryl subcutaneously and interrupted 5-0 nylon to reapproximate the epidermis.  Bactroban ointment was applied and the  procedure terminated.    The patient was transported upon extubation to the postanesthesia care unit in stable condition.    SPECIMENS:  A: Squamous cell carcinoma in situ of the forehead    COMPLICATIONS: NONE    ESTIMATED BLOOD LOSS:  Minimal    Andrea Ceja MD  3/10/2021

## 2021-03-10 NOTE — ANESTHESIA POSTPROCEDURE EVALUATION
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        Post Anesthesia Care and Evaluation    PONV Status: none  Comments: Patient d/c from PACU prior to anes eval based on Azck score.  Please see RN notes for details of d/c criteria.    Blood pressure 105/59, pulse 55, temperature 98.2 °F (36.8 °C), temperature source Temporal, resp. rate 16, SpO2 99 %, not currently breastfeeding.

## 2021-03-10 NOTE — DISCHARGE INSTRUCTIONS

## 2021-03-10 NOTE — ANESTHESIA PREPROCEDURE EVALUATION
Anesthesia Evaluation     Patient summary reviewed   no history of anesthetic complications:  NPO Solid Status: > 8 hours             Airway   Mallampati: II  TM distance: >3 FB  Neck ROM: full  Dental      Pulmonary    (+) asthma,  (-) COPD, sleep apnea, not a smoker  Cardiovascular   Exercise tolerance: excellent (>7 METS)    ECG reviewed    (+) hypertension,   (-) pacemaker, past MI, angina, cardiac stents      Neuro/Psych  (-) seizures, TIA, CVA  GI/Hepatic/Renal/Endo    (+) morbid obesity, GERD, PUD,  liver disease fatty liver disease,   (-) no renal disease, diabetes    Musculoskeletal     (+) back pain,   Abdominal    Substance History      OB/GYN          Other                        Anesthesia Plan    ASA 3     MAC       Anesthetic plan, all risks, benefits, and alternatives have been provided, discussed and informed consent has been obtained with: patient.

## 2021-03-11 LAB
LAB AP CASE REPORT: NORMAL
LAB AP CLINICAL INFORMATION: NORMAL
Lab: NORMAL
PATH REPORT.FINAL DX SPEC: NORMAL
PATH REPORT.GROSS SPEC: NORMAL

## 2021-03-22 ENCOUNTER — OFFICE VISIT (OUTPATIENT)
Dept: OTOLARYNGOLOGY | Facility: CLINIC | Age: 46
End: 2021-03-22

## 2021-03-22 DIAGNOSIS — D04.39 SQUAMOUS CELL CARCINOMA IN SITU (SCCIS) OF SKIN OF FOREHEAD: Primary | ICD-10-CM

## 2021-03-22 PROCEDURE — 99024 POSTOP FOLLOW-UP VISIT: CPT | Performed by: OTOLARYNGOLOGY

## 2021-03-22 NOTE — PROGRESS NOTES
Procedure   Suture Removal    Date/Time: 3/22/2021 10:16 AM  Performed by: Maggie White RN  Authorized by: Andrea Ceja MD   Body area: head/neck  Location details: forehead  Comments: Patient presents for suture removal. The incision is well-approximated with no redness or infection noted. Patient notified of path

## 2021-04-12 ENCOUNTER — TELEPHONE (OUTPATIENT)
Dept: NEUROSURGERY | Facility: CLINIC | Age: 46
End: 2021-04-12

## 2021-04-13 ENCOUNTER — OFFICE VISIT (OUTPATIENT)
Dept: NEUROSURGERY | Facility: CLINIC | Age: 46
End: 2021-04-13

## 2021-04-13 VITALS — HEIGHT: 66 IN | BODY MASS INDEX: 41.78 KG/M2 | WEIGHT: 260 LBS

## 2021-04-13 DIAGNOSIS — R20.2 NUMBNESS AND TINGLING OF LEFT LOWER EXTREMITY: ICD-10-CM

## 2021-04-13 DIAGNOSIS — M54.50 LUMBAR BACK PAIN: ICD-10-CM

## 2021-04-13 DIAGNOSIS — G89.29 CHRONIC MIDLINE THORACIC BACK PAIN: ICD-10-CM

## 2021-04-13 DIAGNOSIS — M54.6 CHRONIC MIDLINE THORACIC BACK PAIN: ICD-10-CM

## 2021-04-13 DIAGNOSIS — E66.01 CLASS 3 SEVERE OBESITY DUE TO EXCESS CALORIES WITH SERIOUS COMORBIDITY AND BODY MASS INDEX (BMI) OF 40.0 TO 44.9 IN ADULT (HCC): ICD-10-CM

## 2021-04-13 DIAGNOSIS — M41.20 SCOLIOSIS (AND KYPHOSCOLIOSIS), IDIOPATHIC: Primary | ICD-10-CM

## 2021-04-13 DIAGNOSIS — M79.605 PAIN OF LEFT LOWER EXTREMITY: ICD-10-CM

## 2021-04-13 DIAGNOSIS — R20.0 NUMBNESS AND TINGLING OF LEFT LOWER EXTREMITY: ICD-10-CM

## 2021-04-13 PROCEDURE — 99214 OFFICE O/P EST MOD 30 MIN: CPT | Performed by: NURSE PRACTITIONER

## 2021-04-13 RX ORDER — CELECOXIB 100 MG/1
CAPSULE ORAL
COMMUNITY
End: 2023-03-01

## 2021-04-13 RX ORDER — POTASSIUM CHLORIDE 20 MEQ/1
TABLET, EXTENDED RELEASE ORAL
COMMUNITY
End: 2023-03-01

## 2021-04-13 NOTE — PATIENT INSTRUCTIONS
"https://www.nhlbi.nih.gov/files/docs/public/heart/dash_brief.pdf\">   DASH Eating Plan  DASH stands for Dietary Approaches to Stop Hypertension. The DASH eating plan is a healthy eating plan that has been shown to:  · Reduce high blood pressure (hypertension).  · Reduce your risk for type 2 diabetes, heart disease, and stroke.  · Help with weight loss.  What are tips for following this plan?  Reading food labels  · Check food labels for the amount of salt (sodium) per serving. Choose foods with less than 5 percent of the Daily Value of sodium. Generally, foods with less than 300 milligrams (mg) of sodium per serving fit into this eating plan.  · To find whole grains, look for the word \"whole\" as the first word in the ingredient list.  Shopping  · Buy products labeled as \"low-sodium\" or \"no salt added.\"  · Buy fresh foods. Avoid canned foods and pre-made or frozen meals.  Cooking  · Avoid adding salt when cooking. Use salt-free seasonings or herbs instead of table salt or sea salt. Check with your health care provider or pharmacist before using salt substitutes.  · Do not ferrell foods. Cook foods using healthy methods such as baking, boiling, grilling, roasting, and broiling instead.  · Cook with heart-healthy oils, such as olive, canola, avocado, soybean, or sunflower oil.  Meal planning    · Eat a balanced diet that includes:  ? 4 or more servings of fruits and 4 or more servings of vegetables each day. Try to fill one-half of your plate with fruits and vegetables.  ? 6-8 servings of whole grains each day.  ? Less than 6 oz (170 g) of lean meat, poultry, or fish each day. A 3-oz (85-g) serving of meat is about the same size as a deck of cards. One egg equals 1 oz (28 g).  ? 2-3 servings of low-fat dairy each day. One serving is 1 cup (237 mL).  ? 1 serving of nuts, seeds, or beans 5 times each week.  ? 2-3 servings of heart-healthy fats. Healthy fats called omega-3 fatty acids are found in foods such as walnuts, " flaxseeds, fortified milks, and eggs. These fats are also found in cold-water fish, such as sardines, salmon, and mackerel.  · Limit how much you eat of:  ? Canned or prepackaged foods.  ? Food that is high in trans fat, such as some fried foods.  ? Food that is high in saturated fat, such as fatty meat.  ? Desserts and other sweets, sugary drinks, and other foods with added sugar.  ? Full-fat dairy products.  · Do not salt foods before eating.  · Do not eat more than 4 egg yolks a week.  · Try to eat at least 2 vegetarian meals a week.  · Eat more home-cooked food and less restaurant, buffet, and fast food.  Lifestyle  · When eating at a restaurant, ask that your food be prepared with less salt or no salt, if possible.  · If you drink alcohol:  ? Limit how much you use to:  § 0-1 drink a day for women who are not pregnant.  § 0-2 drinks a day for men.  ? Be aware of how much alcohol is in your drink. In the [place] one drink equals one 12 oz bottle of beer (355 mL), one 5 oz glass of wine (148 mL), or one 1½ oz glass of hard liquor (44 mL).  General information  · Avoid eating more than 2,300 mg of salt a day. If you have hypertension, you may need to reduce your sodium intake to 1,500 mg a day.  · Work with your health care provider to maintain a healthy body weight or to lose weight. Ask what an ideal weight is for you.  · Get at least 30 minutes of exercise that causes your heart to beat faster (aerobic exercise) most days of the week. Activities may include walking, swimming, or biking.  · Work with your health care provider or dietitian to adjust your eating plan to your individual calorie needs.  What foods should I eat?  Fruits  All fresh, dried, or frozen fruit. Canned fruit in natural juice (without added sugar).  Vegetables  Fresh or frozen vegetables (raw, steamed, roasted, or grilled). Low-sodium or reduced-sodium tomato and vegetable juice. Low-sodium or reduced-sodium tomato sauce and tomato paste.  Low-sodium or reduced-sodium canned vegetables.  Grains  Whole-grain or whole-wheat bread. Whole-grain or whole-wheat pasta. Brown rice. Oatmeal. Quinoa. Bulgur. Whole-grain and low-sodium cereals. Leeanne bread. Low-fat, low-sodium crackers. Whole-wheat flour tortillas.  Meats and other proteins  Skinless chicken or turkey. Ground chicken or turkey. Pork with fat trimmed off. Fish and seafood. Egg whites. Dried beans, peas, or lentils. Unsalted nuts, nut butters, and seeds. Unsalted canned beans. Lean cuts of beef with fat trimmed off. Low-sodium, lean precooked or cured meat, such as sausages or meat loaves.  Dairy  Low-fat (1%) or fat-free (skim) milk. Reduced-fat, low-fat, or fat-free cheeses. Nonfat, low-sodium ricotta or cottage cheese. Low-fat or nonfat yogurt. Low-fat, low-sodium cheese.  Fats and oils  Soft margarine without trans fats. Vegetable oil. Reduced-fat, low-fat, or light mayonnaise and salad dressings (reduced-sodium). Canola, safflower, olive, avocado, soybean, and sunflower oils. Avocado.  Seasonings and condiments  Herbs. Spices. Seasoning mixes without salt.  Other foods  Unsalted popcorn and pretzels. Fat-free sweets.  The items listed above may not be a complete list of foods and beverages you can eat. Contact a dietitian for more information.  What foods should I avoid?  Fruits  Canned fruit in a light or heavy syrup. Fried fruit. Fruit in cream or butter sauce.  Vegetables  Creamed or fried vegetables. Vegetables in a cheese sauce. Regular canned vegetables (not low-sodium or reduced-sodium). Regular canned tomato sauce and paste (not low-sodium or reduced-sodium). Regular tomato and vegetable juice (not low-sodium or reduced-sodium). Pickles. Olives.  Grains  Baked goods made with fat, such as croissants, muffins, or some breads. Dry pasta or rice meal packs.  Meats and other proteins  Fatty cuts of meat. Ribs. Fried meat. [place]go. Bologna, salami, and other precooked or cured meats, such as  sausages or meat loaves. Fat from the back of a pig (fatback). Bratwurst. Salted nuts and seeds. Canned beans with added salt. Canned or smoked fish. Whole eggs or egg yolks. Chicken or turkey with skin.  Dairy  Whole or 2% milk, cream, and half-and-half. Whole or full-fat cream cheese. Whole-fat or sweetened yogurt. Full-fat cheese. Nondairy creamers. Whipped toppings. Processed cheese and cheese spreads.  Fats and oils  Butter. Stick margarine. Lard. Shortening. Ghee. [place]idalgo fat. Tropical oils, such as coconut, palm kernel, or palm oil.  Seasonings and condiments  Onion salt, garlic salt, seasoned salt, table salt, and sea salt. Worcestershire sauce. Tartar sauce. Barbecue sauce. Teriyaki sauce. Soy sauce, including reduced-sodium. Steak sauce. Canned and packaged gravies. Fish sauce. Oyster sauce. Cocktail sauce. Store-bought horseradish. Ketchup. Mustard. Meat flavorings and tenderizers. Bouillon cubes. Hot sauces. Pre-made or packaged marinades. Pre-made or packaged taco seasonings. Relishes. Regular salad dressings.  Other foods  Salted popcorn and pretzels.  The items listed above may not be a complete list of foods and beverages you should avoid. Contact a dietitian for more information.  Where to find more information  · National Heart, Lung, and Blood [place]: www.nhlbi.nih.gov  · American Heart Association: www.heart.org  · Academy of Nutrition and Dietetics: www.eatright.org  · National Kidney Foundation: www.kidney.org  Summary  · The DASH eating plan is a healthy eating plan that has been shown to reduce high blood pressure (hypertension). It may also reduce your risk for type 2 diabetes, heart disease, and stroke.  · When on the DASH eating plan, aim to eat more fresh fruits and vegetables, whole grains, lean proteins, low-fat dairy, and heart-healthy fats.  · With the DASH eating plan, you should limit salt (sodium) intake to 2,300 mg a day. If you have hypertension, you may need to reduce your  sodium intake to 1,500 mg a day.  · Work with your health care provider or dietitian to adjust your eating plan to your individual calorie needs.  This information is not intended to replace advice given to you by your health care provider. Make sure you discuss any questions you have with your health care provider.  Document Revised: 11/20/2020 Document Reviewed: 11/20/2020  [employer] Patient Education © 2021 [employer]

## 2021-04-13 NOTE — PROGRESS NOTES
Chief complaint:   Chief Complaint   Patient presents with   • Back Pain     Pt is here for followup for back pain.    Pt has completed physical therapy.     Subjective     HPI:   Last encounter: 12/30/2020    Interval History: Sara Foy is a 45 y.o.  female who presents today for follow-up for both thoracic and lumbar back pain.  Recently completed a dedicated course of physician directed physical therapy with some overall relief in her discomfort.  She is also participating in chiropractic care, massage, water aerobics, and injections per Dr. Borges.    In regards to her thoracic spine, she continues to complain of constant mid thoracic back pain that intermittently radiates to the left ribs.  She additionally reports frequent muscle spasms and numbness and tingling in the same distribution.  In regards to her lumbar spine, she continues to complain of constant left lower lumbar back pain that intermittently radiates to the right hip as well as through the posterior lateral left thigh/leg extending to the foot.  Her overall discomfort worsens with prolonged lying down, sitting, standing, walking, and with exposure to the cold.  Alleviating factors include combined therapy with frequent stretching and use of oxycodone and Celebrex.  She denies fevers, chills, night sweats, unexplained weight loss, saddle anesthesia, or bowel or bladder dysfunction.  She currently rates the severity of her symptoms 6/10.  No additional concerns at this time.    Oswestry Disability Index = 48%   Score   Pain Intensity Moderate pain-2   Personal Care I can look after myself but it is slow and painful-2   Lifting Medium weights off a table-3   Walking Pain prevents > 1/4 mile-2   Sitting Pain prevents sitting > 30 min-3   Standing Pain limits standing to < 1/2 hr-3   Sleeping Can only sleep < 6 hrs-2   Sex Life (if applicable) Sex life nearly  normal but very painful-3   Social Life I do not go out as often because of  pain-3   Traveling Pain is bad but trips over 2 hrs-2   ([place] et al, 1980)    SCORE INTERPRETATION OF THE OSWESTRY LBP DISABILITY QUESTIONNAIRE     40-60% Severe disability Pain remains the main problem in this group of patients, but travel, personal care, social life, sexual activity, and sleep are also affected.  These patients require detailed investigation.     ROS  Review of Systems   Constitutional: Negative.    HENT: Negative.    Eyes: Negative.    Respiratory: Negative.    Cardiovascular: Negative.    Gastrointestinal: Negative.    Endocrine: Negative.    Genitourinary: Negative.    Musculoskeletal: Positive for back pain and neck stiffness.   Skin: Negative.    Allergic/Immunologic: Negative.    Neurological: Positive for numbness.   Hematological: Negative.    Psychiatric/Behavioral: Negative.    All other systems reviewed and are negative.    PFSH:  Past Medical History:   Diagnosis Date   • Asthma    • Chronic pain    • DVT (deep venous thrombosis) (CMS/HCC)    • GERD (gastroesophageal reflux disease)    • High blood pressure    • Peptic ulcer    • Scoliosis    • Skin lesion      Past Surgical History:   Procedure Laterality Date   • FLAP HEAD/NECK N/A 3/10/2021    Procedure: possible flap or graft;  Surgeon: Andrea Ceja MD;  Location:  [facility] OR;  Service: ENT;  Laterality: N/A;   • HEAD/NECK LESION/CYST EXCISION N/A 3/10/2021    Procedure: Excision of squamous cell carcinoma in situ of the of the forehead\glabella with transposition flap;  Surgeon: Andrea Ceja MD;  Location:  [facility] OR;  Service: ENT;  Laterality: N/A;   • HYSTERECTOMY  2016     Objective      Current Outpatient Medications   Medication Sig Dispense Refill   • celecoxib (CeleBREX) 100 MG capsule celecoxib 100 mg capsule     • chlorthalidone (HYGROTON) 25 MG tablet chlorthalidone 25 mg tablet   Take 1 tablet every day by oral route.     • Cholecalciferol 50 MCG (2000 UT) capsule D3-2000 50 mcg (2,000 unit) capsule    "Take 1 capsule every day by oral route.     • fluticasone (FLONASE) 50 MCG/ACT nasal spray 2 sprays into the nostril(s) as directed by provider Daily As Needed.     • HYDROcodone-acetaminophen (NORCO) 7.5-325 MG per tablet Every 8 (Eight) Hours As Needed.     • metoprolol succinate XL (TOPROL-XL) 25 MG 24 hr tablet metoprolol succinate ER 25 mg tablet,extended release 24 hr   Take 1 tablet every day by oral route.     • omeprazole (priLOSEC) 40 MG capsule Take 40 mg by mouth Daily As Needed.     • potassium chloride (K-DUR,KLOR-CON) 20 MEQ CR tablet potassium chloride ER 20 mEq tablet,extended release   Take 1 tablet every day by oral route.     • Sucralfate (CARAFATE PO) Take 1 g by mouth Daily As Needed.       No current facility-administered medications for this visit.     Vital Signs  Ht 167.6 cm (66\") Comment: pt reports  Wt 118 kg (260 lb)   Breastfeeding No   BMI 41.97 kg/m²   Physical Exam  Vitals and nursing note reviewed.   Constitutional:       General: She is not in acute distress.     Appearance: Normal appearance. She is well-developed and well-groomed. She is morbidly obese. She is not ill-appearing or toxic-appearing.      Comments: BMI 41.97   HENT:      Head: Normocephalic and atraumatic.      Right Ear: Hearing normal.      Left Ear: Hearing normal.   Eyes:      Extraocular Movements: EOM normal.      Conjunctiva/sclera: Conjunctivae normal.      Pupils: Pupils are equal, round, and reactive to light.   Neck:      Trachea: Trachea normal.   Cardiovascular:      Rate and Rhythm: Normal rate and regular rhythm.   Pulmonary:      Effort: Pulmonary effort is normal. No tachypnea, bradypnea, accessory muscle usage or respiratory distress.   Abdominal:      Palpations: Abdomen is soft.   Musculoskeletal:      Cervical back: Full passive range of motion without pain and neck supple.   Skin:     General: Skin is warm and dry.   Neurological:      Mental Status: She is alert and oriented to person, " place, and time.      GCS: GCS eye subscore is 4. GCS verbal subscore is 5. GCS motor subscore is 6.      Gait: Gait is intact.      Deep Tendon Reflexes:      Reflex Scores:       Tricep reflexes are 2+ on the right side and 2+ on the left side.       Bicep reflexes are 2+ on the right side and 2+ on the left side.       Brachioradialis reflexes are 2+ on the right side and 2+ on the left side.       Patellar reflexes are 2+ on the right side and 2+ on the left side.       Achilles reflexes are 2+ on the right side and 2+ on the left side.  Psychiatric:         Speech: Speech normal.         Behavior: Behavior normal. Behavior is cooperative.       Neurologic Exam     Mental Status   Oriented to person, place, and time.   Attention: normal. Concentration: normal.   Speech: speech is normal   Level of consciousness: alert    Cranial Nerves     CN II   Visual fields full to confrontation.     CN III, IV, VI   Pupils are equal, round, and reactive to light.  Extraocular motions are normal.     CN V   Facial sensation intact.     CN VII   Facial expression full, symmetric.     CN VIII   CN VIII normal.     CN IX, X   CN IX normal.     CN XI   CN XI normal.     Motor Exam   Right arm tone: normal  Left arm tone: normal  Right leg tone: normal  Left leg tone: normal    Strength   Right deltoid: 5/5  Left deltoid: 5/5  Right biceps: 5/5  Left biceps: 5/5  Right triceps: 5/5  Left triceps: 5/5  Right wrist extension: 5/5  Left wrist extension: 5/5  Right iliopsoas: 5/5  Left iliopsoas: 5/5  Right quadriceps: 5/5  Left quadriceps: 5/5  Right anterior tibial: 5/5  Left anterior tibial: 5/5  Right gastroc: 5/5  Left gastroc: 5/5  Right EHL 5/5  Left EHL 5/5       Sensory Exam   Right arm light touch: normal  Left arm light touch: normal  Right leg light touch: normal  Left leg light touch: normal    Gait, Coordination, and Reflexes     Gait  Gait: normal    Tremor   Resting tremor: absent  Intention tremor: absent  Action  tremor: absent    Reflexes   Right brachioradialis: 2+  Left brachioradialis: 2+  Right biceps: 2+  Left biceps: 2+  Right triceps: 2+  Left triceps: 2+  Right patellar: 2+  Left patellar: 2+  Right achilles: 2+  Left achilles: 2+  Right : 4+  Left : 4+  Right plantar: normal  Left plantar: normal  Right Rankin: absent  Left Rankin: absent  Right ankle clonus: absent  Left ankle clonus: absent  Right pendular knee jerk: absent  Left pendular knee jerk: absent  (12 bullet pts)    Results Review:   6/15/2020.  X-rays of the cervical spine show reversal of the normal cervical lordosis, no acute fractures or listhesis, multilevel degenerative changes.       6/18/2020 MRI of the cervical spine shows a subtle reversal of the normal cervical lordosis, no acute fractures, listhesis, or T2 signal change within the central cord, multilevel degenerative changes resulting in thecal sac compression that appears worse at C5-6; no significant central canal or foraminal stenosis noted within the cervical spine.       9/14/2020       Prominent rotoscoliosis of the thoracolumbar spine. Curvature is to the right within the thoracic region with the apex of curvature at the T6/T7 level measuring approximately 27 degrees. Curvature is to the left in the lumbar region with the apex at the L2 level measuring approximately 57 degrees. Moderate degenerative disc narrowing and the lumbar region.     9/14/2020         Assessment/Plan: Sara Foy is a 45 y.o. female with a significant medical history of untreated idiopathic scoliosis and morbid obesity.  She presents with a known problem of primarily thoracic and lumbar back pain with known scoliosis. DERRICK: 62, 57.99, 48.  Physical exam findings of neurologically intact. Her imaging: Prior MRI of the cervical spine shows multilevel degenerative changes resulting in areas of thecal sac compression without significant central canal or foraminal stenosis.    Scoliosis imaging show  prominent rotoscoliosis of the thoracolumbar spine. Curvature is to the right within the thoracic region with the apex of curvature at the T6/T7 level measuring approximately 27 degrees. Curvature is to the left in the lumbar region with the apex at the L2 level measuring approximately 57 degrees. Moderate degenerative disc narrowing and the lumbar region.    Recommendations:  Idiopathic scoliosis  Thoracic back pain   Lumbar back and left leg pain in the L5 distribution  Intermittent numbness and tingling to the left lower extremity  Differential diagnosis include, but are not limited to degenerative idiopathic scoliosis, degenerative facet arthropathy, degenerative lumbar stenosis, lumbar stenosis with neurogenic claudication and Lumbar stenosis with Left lower extremity radiculopathy.      Ms. Foy recently completed a dedicated course of physician directed physical therapy as a means of first-line conservative management for Thoracic and Lumbar pain that was beneficial in treating her overall discomfort.  Despite her improvements, she continues to complain of both thoracic and lumbar back pain.  We will proceed today by sending Sara for a noncontrasted MRI of the thoracic and lumbar spine to rule out need for surgical intervention.  Unless contraindicated, continue Tylenol and ibuprofen or naproxen PRN per package instructions for pain, or may continue current pain medications as previously prescribed by outside clinician.  B/R/AE and use discussed.  We will additionally send her for an EMG/NCS of the left leg to confirm or refute radiculopathy from the lumbar spine.  Once all testing is complete we will have Ms. Foy follow-up with Dr. Houston at his next avaliable appointment for reassessment and to discuss the need for surgical intervention.  In the interim, I highly recommended she continue conservative management with water aerobics, massage, chiropractic care, and pain management.  I advised the  patient to call to return sooner for new or worsening complaints of weakness, paresthesias, gait disturbances, or any additional concerns.  Treatment options discussed in detail with Sara and she voiced understanding.  Ms. Foy agrees with this plan of care.    Obese Class III extreme obesity: > or equal to 40kg/m2  Body mass index is 41.97 kg/m².  Information on the DASH diet provided in the AVS.  We will continue to provided diet and exercise information with the goal of weight loss at each scheduled appointment.     Diagnoses and all orders for this visit:    1. Scoliosis (and kyphoscoliosis), idiopathic (Primary)    2. Chronic midline thoracic back pain  -     MRI Thoracic Spine Without Contrast; Future    3. Lumbar back pain  -     MRI Lumbar Spine Without Contrast; Future    4. Pain of left lower extremity  -     MRI Lumbar Spine Without Contrast; Future  -     MRI Thoracic Spine Without Contrast; Future    5. Numbness and tingling of left lower extremity  -     EMG & Nerve Conduction Test; Future    6. Class 3 severe obesity due to excess calories with serious comorbidity and body mass index (BMI) of 40.0 to 44.9 in adult (CMS/[facility])      Return for FOLLOW UP WITH DR. JOYNER NEXT AVAILABLE WITH MRI.    Level of Risk: Moderate due to: undiagnosed new problem  MDM: Moderate  (Mod = 91757, High = 86810)    Thank you, for allowing me to continue to participate in the care of this patient.    Sincerely,  MITCH Galindo

## 2021-05-07 ENCOUNTER — TELEPHONE (OUTPATIENT)
Dept: NEUROSURGERY | Facility: CLINIC | Age: 46
End: 2021-05-07

## 2021-05-07 NOTE — TELEPHONE ENCOUNTER
Regarding appointment 05/10/21 for testing of MRI & EMG. MRI thoracic spine denied by insurance, but appears all other testing approved. Attempted to reach patient, straight to . Left very detailed message for patient that MRI thoracic has been denied but MRI lumbar & EMG testing has been approved. Advised patient to call back with any questions/concerns.

## 2021-05-10 ENCOUNTER — APPOINTMENT (OUTPATIENT)
Dept: MRI IMAGING | Facility: HOSPITAL | Age: 46
End: 2021-05-10

## 2021-05-10 ENCOUNTER — HOSPITAL ENCOUNTER (OUTPATIENT)
Dept: NEUROLOGY | Facility: HOSPITAL | Age: 46
Discharge: HOME | End: 2021-05-10

## 2021-05-10 ENCOUNTER — HOSPITAL ENCOUNTER (OUTPATIENT)
Dept: MRI IMAGING | Facility: HOSPITAL | Age: 46
Discharge: HOME | End: 2021-05-10

## 2021-05-10 DIAGNOSIS — R20.2 NUMBNESS AND TINGLING OF LEFT LOWER EXTREMITY: ICD-10-CM

## 2021-05-10 DIAGNOSIS — M54.50 LUMBAR BACK PAIN: ICD-10-CM

## 2021-05-10 DIAGNOSIS — M79.605 PAIN OF LEFT LOWER EXTREMITY: ICD-10-CM

## 2021-05-10 DIAGNOSIS — R20.0 NUMBNESS AND TINGLING OF LEFT LOWER EXTREMITY: ICD-10-CM

## 2021-05-10 PROCEDURE — 95909 NRV CNDJ TST 5-6 STUDIES: CPT

## 2021-05-10 PROCEDURE — 95886 MUSC TEST DONE W/N TEST COMP: CPT

## 2021-05-10 PROCEDURE — 72148 MRI LUMBAR SPINE W/O DYE: CPT

## 2021-05-12 ENCOUNTER — TELEPHONE (OUTPATIENT)
Dept: NEUROSURGERY | Facility: CLINIC | Age: 46
End: 2021-05-12

## 2021-05-12 DIAGNOSIS — M54.14 THORACIC RADICULOPATHY: ICD-10-CM

## 2021-05-12 DIAGNOSIS — M41.124 ADOLESCENT IDIOPATHIC SCOLIOSIS OF THORACIC REGION: Primary | ICD-10-CM

## 2021-05-12 DIAGNOSIS — M54.6 PAIN IN THORACIC SPINE: ICD-10-CM

## 2021-05-12 NOTE — TELEPHONE ENCOUNTER
Gabriel I called and spoke with pt and she said the pain in her thoracic area is worse. She reports severe pain between shoulders.    Her appt with Dr. Houston is not until Nov.  Please review and advise.    Thanks

## 2021-05-12 NOTE — TELEPHONE ENCOUNTER
PT IS CALLING WANTING TO KNOW WHAT SHE CAN DO TO GET HER MRI T SPINE;   I HAVE TOLD HER IT WAS DENIED BY HER INSURANCE AND SHE SAID SHE IS FULLY AWARE OF THAT; AND WANTS TO KNOW WHATS NEXT.   I TOLD HER I WAS UNABLE TO SEE THE DENIAL REASON BECAUSE I DID NOT SUBMIT THE AUTH REQUEST.  I ALSO DID  NOT WANT TO TELL HER THAT A P2P WAS OFFERED BUT IT WASN'T PEFORMED     PLEASE CALL PT @ [phone]

## 2021-05-13 NOTE — TELEPHONE ENCOUNTER
PT notified that new order was placed.   Pt was very appreciative.   We will wait for response from ins and see what we need to do next.

## 2021-06-03 ENCOUNTER — HOSPITAL ENCOUNTER (OUTPATIENT)
Dept: MRI IMAGING | Facility: HOSPITAL | Age: 46
Discharge: HOME | End: 2021-06-03
Admitting: NURSE PRACTITIONER

## 2021-06-03 DIAGNOSIS — M54.14 THORACIC RADICULOPATHY: ICD-10-CM

## 2021-06-03 DIAGNOSIS — M54.6 PAIN IN THORACIC SPINE: ICD-10-CM

## 2021-06-03 DIAGNOSIS — M41.124 ADOLESCENT IDIOPATHIC SCOLIOSIS OF THORACIC REGION: ICD-10-CM

## 2021-06-03 PROCEDURE — 72146 MRI CHEST SPINE W/O DYE: CPT

## 2021-07-09 ENCOUNTER — OFFICE VISIT (OUTPATIENT)
Dept: OTOLARYNGOLOGY | Facility: CLINIC | Age: 46
End: 2021-07-09

## 2021-07-09 VITALS
TEMPERATURE: 98.6 F | HEIGHT: 66 IN | HEART RATE: 59 BPM | WEIGHT: 260 LBS | SYSTOLIC BLOOD PRESSURE: 137 MMHG | BODY MASS INDEX: 41.78 KG/M2 | DIASTOLIC BLOOD PRESSURE: 89 MMHG

## 2021-07-09 DIAGNOSIS — D04.39 SQUAMOUS CELL CARCINOMA IN SITU (SCCIS) OF SKIN OF FOREHEAD: Primary | ICD-10-CM

## 2021-07-09 PROCEDURE — 99212 OFFICE O/P EST SF 10 MIN: CPT | Performed by: NURSE PRACTITIONER

## 2021-07-09 NOTE — PROGRESS NOTES
PRIMARY CARE PROVIDER: Liz Milner APRN  REFERRING PROVIDER: No ref. provider found    Chief Complaint   Patient presents with   • Squamous Cell Carcinoma       Subjective   History of Present Illness:  Sara Foy is a  45 y.o. 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 Ceja on 3/10/2021.  The patient has had a relatively normal postoperative course.  She currently denies any related complaints.  She denies pain, fever, chills, bleeding, or drainage.      Past History:  Past Medical History:   Diagnosis Date   • Asthma    • Chronic pain    • DVT (deep venous thrombosis) (CMS/HCC)    • GERD (gastroesophageal reflux disease)    • High blood pressure    • Peptic ulcer    • Scoliosis    • Skin lesion      Past Surgical History:   Procedure Laterality Date   • FLAP HEAD/NECK N/A 3/10/2021    Procedure: possible flap or graft;  Surgeon: Andrea Ceja MD;  Location:  [facility];  Service: ENT;  Laterality: N/A;   • HEAD/NECK LESION/CYST EXCISION N/A 3/10/2021    Procedure: Excision of squamous cell carcinoma in situ of the of the forehead\glabella with transposition flap;  Surgeon: Andrea Ceja MD;  Location:  [facility] OR;  Service: ENT;  Laterality: N/A;   • HYSTERECTOMY  2016     Family History   Problem Relation Age of Onset   • No Known Problems Mother    • No Known Problems Father      Social History     Tobacco Use   • Smoking status: Never Smoker   • Smokeless tobacco: Never Used   Vaping Use   • Vaping Use: Never used   Substance Use Topics   • Alcohol use: Yes     Comment: socially   • Drug use: Not Currently     Comment: no recent substance abuse, only in her teens     Allergies:  Penicillins, Duloxetine hcl, Contrast dye, and Nsaids    Current Outpatient Medications:   •  celecoxib (CeleBREX) 100 MG capsule, celecoxib 100 mg capsule, Disp: , Rfl:   •  chlorthalidone (HYGROTON) 25 MG tablet,  "chlorthalidone 25 mg tablet  Take 1 tablet every day by oral route., Disp: , Rfl:   •  Cholecalciferol 50 MCG (2000 UT) capsule, D3-2000 50 mcg (2,000 unit) capsule  Take 1 capsule every day by oral route., Disp: , Rfl:   •  fluticasone (FLONASE) 50 MCG/ACT nasal spray, 2 sprays into the nostril(s) as directed by provider Daily As Needed., Disp: , Rfl:   •  HYDROcodone-acetaminophen (NORCO) 7.5-325 MG per tablet, Every 8 (Eight) Hours As Needed., Disp: , Rfl:   •  omeprazole (priLOSEC) 40 MG capsule, Take 40 mg by mouth Daily As Needed., Disp: , Rfl:   •  potassium chloride (K-DUR,KLOR-CON) 20 MEQ CR tablet, potassium chloride ER 20 mEq tablet,extended release  Take 1 tablet every day by oral route., Disp: , Rfl:   •  Sucralfate (CARAFATE PO), Take 1 g by mouth Daily As Needed., Disp: , Rfl:       Objective     Vital Signs:    /89   Pulse 59   Temp 98.6 °F (37 °C) (Temporal)   Ht 167.6 cm (66\")   Wt 118 kg (260 lb)   BMI 41.97 kg/m²     Physical Exam:  Physical Exam  Vitals reviewed.   Constitutional:       General: She is not in acute distress.     Appearance: She is well-developed.   HENT:      Head: Normocephalic and atraumatic.        Right Ear: External ear normal.      Left Ear: External ear normal.      Mouth/Throat:      Lips: Pink.   Eyes:      General: Lids are normal.   Pulmonary:      Effort: Pulmonary effort is normal. No respiratory distress.      Breath sounds: No stridor.   Musculoskeletal:      Cervical back: Neck supple.   Neurological:      Mental Status: She is alert and oriented to person, place, and time.   Psychiatric:         Mood and Affect: Mood normal.         Speech: Speech normal.         Behavior: Behavior normal. Behavior is cooperative.         Results Review:         Assessment   Assessment:  1. Squamous cell carcinoma in situ (SCCIS) of skin of forehead        Plan   Plan:  Protect the incisions from sunlight. Sunlight to the incisions will cause permanent pigmentation to " the incision line and make the incision more noticeable. After the incision has reepithelialized (typically 2-3 weeks after the procedure), you may begin to use sunscreen with an SPF of 15 or greater    May use a silicone-based wound cream to the incisions to optimize the end result. Apply topically twice daily, or as directed, to help optimize wound healing and decrease redness.    Keep regular scheduled follow-ups with Dr. Tabby Ceja.    No orders of the defined types were placed in this encounter.    There are no Patient Instructions on file for this visit.  Return if symptoms worsen or fail to improve, for Recheck.    My findings and recommendations were discussed and questions were answered.     Nesha Mcqueen, APRN

## 2021-11-01 ENCOUNTER — HOSPITAL ENCOUNTER (OUTPATIENT)
Dept: GENERAL RADIOLOGY | Facility: HOSPITAL | Age: 46
Discharge: HOME | End: 2021-11-01

## 2021-11-01 ENCOUNTER — OFFICE VISIT (OUTPATIENT)
Dept: NEUROSURGERY | Facility: CLINIC | Age: 46
End: 2021-11-01

## 2021-11-01 VITALS — BODY MASS INDEX: 40.72 KG/M2 | WEIGHT: 253.4 LBS | HEIGHT: 66 IN

## 2021-11-01 DIAGNOSIS — M54.50 LUMBAR BACK PAIN: ICD-10-CM

## 2021-11-01 DIAGNOSIS — M41.129 ADOLESCENT IDIOPATHIC SCOLIOSIS, UNSPECIFIED SPINAL REGION: Primary | ICD-10-CM

## 2021-11-01 DIAGNOSIS — M41.129 ADOLESCENT IDIOPATHIC SCOLIOSIS, UNSPECIFIED SPINAL REGION: ICD-10-CM

## 2021-11-01 DIAGNOSIS — E66.01 CLASS 3 SEVERE OBESITY DUE TO EXCESS CALORIES WITH SERIOUS COMORBIDITY AND BODY MASS INDEX (BMI) OF 40.0 TO 44.9 IN ADULT (HCC): ICD-10-CM

## 2021-11-01 DIAGNOSIS — M79.605 PAIN OF LEFT LOWER EXTREMITY: ICD-10-CM

## 2021-11-01 DIAGNOSIS — M54.6 PAIN IN THORACIC SPINE: ICD-10-CM

## 2021-11-01 PROCEDURE — 99214 OFFICE O/P EST MOD 30 MIN: CPT | Performed by: NEUROLOGICAL SURGERY

## 2021-11-01 PROCEDURE — 72082 X-RAY EXAM ENTIRE SPI 2/3 VW: CPT

## 2021-11-01 RX ORDER — CYCLOBENZAPRINE HCL 10 MG
TABLET ORAL
COMMUNITY
End: 2023-03-01

## 2021-11-01 NOTE — PROGRESS NOTES
Chief complaint:   Chief Complaint   Patient presents with   • Back Pain     pt complains of right hip pain and lumbar pain getting worse; patient brought imaging CD from [facility] for review today.       Subjective     HPI:   Interval History: Sara has done fairly well since I saw her last.  She has however had a flareup in her back pain recently.  Approximately month ago she was at the chiropractor receiving both chiropractic care and deep massage.  Typically the left side of her back and left leg are her worst problem but after this adjustment and she was bending down to put on her pants and she had an acute flareup of right paraspinal pain.  She also has mid thoracic pain with a radicular component previously has had an MRI.  Neither her thoracic or lumbar MRI show any significant compression.  She returns today for follow-up of known scoliosis.  This was identified at the age of 20.    Currently today she complains of right sided lumbar pain that radiates to her hip but not down into her leg.  She has left-sided back pain and chronic left leg pain without color change.  She has undergone some venous surgeries in her leg which do give her some pain and she has to wear compression stocking in her left leg.  Pain in her back feels back is myotic in nature and comes and goes.  It is a 7 out of 10 in severity.  She is still ambulatory and walks without a cane or walker.  She has no changes in bowel or bladder habits.  She continues to see Dr. Hurst for pain management, she is received a steroid shot and recently had a Medrol dose pack, she is currently on Celebrex.  And continues with pain management.    Oswestry Disability Index Lumbar = 52%  SCORE INTERPRETATION OF THE OSWESTRY LBP DISABILITY QUESTIONNAIRE     40-60% Severe disability Pain remains the main problem in this group of patients, but travel, personal care, social life, sexual activity, and sleep are also affected. These patients require  detailed investigation.    Score   Pain Intensity Fairly severe pain-3   Personal Care I need some help but manage most of my personal care-3   Lifting Medium weights off a table-3   Walking Pain prevents > 1 mile-1   Sitting Pain prevents sitting > 1 hr-2   Standing Pain limits standing to < 1/2 hr-3   Sleeping Can only sleep < 4 hrs-3   Sex Life (if applicable) Sex severely restricted by pain-4   Social Life I do not go out as often because of pain-3   Traveling Pain is bad but trips over 2 hrs-2   (Aleisha et al, 1980)    Review of Systems   HENT: Negative.    Eyes: Negative.    Respiratory: Negative.    Cardiovascular: Negative.    Gastrointestinal: Negative.    Endocrine: Negative.    Genitourinary: Negative.    Musculoskeletal: Positive for back pain and gait problem.   Skin: Negative.    Allergic/Immunologic: Negative.    Neurological: Positive for weakness.   Hematological: Negative.    Psychiatric/Behavioral: Negative.        PFSH:  Past Medical History:   Diagnosis Date   • Asthma    • Chronic pain    • DVT (deep venous thrombosis) (HCC)    • GERD (gastroesophageal reflux disease)    • High blood pressure    • Peptic ulcer    • Scoliosis    • Skin lesion        Past Surgical History:   Procedure Laterality Date   • FLAP HEAD/NECK N/A 3/10/2021    Procedure: possible flap or graft;  Surgeon: Andrea Ceja MD;  Location:  [facility] OR;  Service: ENT;  Laterality: N/A;   • HEAD/NECK LESION/CYST EXCISION N/A 3/10/2021    Procedure: Excision of squamous cell carcinoma in situ of the of the forehead\glabella with transposition flap;  Surgeon: Andrea Ceja MD;  Location:  [facility] OR;  Service: ENT;  Laterality: N/A;   • HYSTERECTOMY  2016   • VASCULAR SURGERY         Objective      Current Outpatient Medications   Medication Sig Dispense Refill   • celecoxib (CeleBREX) 100 MG capsule celecoxib 100 mg capsule     • chlorthalidone (HYGROTON) 25 MG tablet chlorthalidone 25 mg tablet   Take 1 tablet every  "day by oral route.     • Cholecalciferol 50 MCG (2000 UT) capsule D3-2000 50 mcg (2,000 unit) capsule   Take 1 capsule every day by oral route.     • cyclobenzaprine (FLEXERIL) 10 MG tablet cyclobenzaprine 10 mg tablet   as needed     • fluticasone (FLONASE) 50 MCG/ACT nasal spray 2 sprays into the nostril(s) as directed by provider Daily As Needed.     • HYDROcodone-acetaminophen (NORCO) 7.5-325 MG per tablet Every 8 (Eight) Hours As Needed.     • omeprazole (priLOSEC) 40 MG capsule Take 40 mg by mouth Daily As Needed.     • potassium chloride (K-DUR,KLOR-CON) 20 MEQ CR tablet potassium chloride ER 20 mEq tablet,extended release   Take 1 tablet every day by oral route.     • Sucralfate (CARAFATE PO) Take 1 g by mouth Daily As Needed.       No current facility-administered medications for this visit.       Vital Signs  Ht 167.6 cm (66\")   Wt 115 kg (253 lb 6.4 oz)   BMI 40.90 kg/m²   Physical Exam  Eyes:      Extraocular Movements: EOM normal.      Pupils: Pupils are equal, round, and reactive to light.   Neurological:      Mental Status: She is oriented to person, place, and time.      Gait: Gait is intact.      Deep Tendon Reflexes:      Reflex Scores:       Tricep reflexes are 1+ on the right side and 1+ on the left side.       Bicep reflexes are 1+ on the right side and 1+ on the left side.       Brachioradialis reflexes are 1+ on the right side and 1+ on the left side.       Patellar reflexes are 1+ on the right side and 1+ on the left side.       Achilles reflexes are 1+ on the right side and 1+ on the left side.  Psychiatric:         Speech: Speech normal.       Neurologic Exam     Mental Status   Oriented to person, place, and time.   Attention: normal. Concentration: normal.   Speech: speech is normal   Level of consciousness: alert    Cranial Nerves     CN II   Visual fields full to confrontation.     CN III, IV, VI   Pupils are equal, round, and reactive to light.  Extraocular motions are normal.     CN " V   Facial sensation intact.     CN VII   Facial expression full, symmetric.     CN VIII   CN VIII normal.     CN IX, X   CN IX normal.     CN XI   CN XI normal.     Motor Exam   Muscle bulk: normal  Right arm tone: normal  Left arm tone: normal  Right leg tone: normal  Left leg tone: normal    Strength   Right deltoid: 5/5  Left deltoid: 5/5  Right biceps: 5/5  Left biceps: 5/5  Right triceps: 5/5  Left triceps: 5/5  Right wrist extension: 5/5  Left wrist extension: 5/5  Right iliopsoas: 5/5  Left iliopsoas: 5/5  Right quadriceps: 5/5  Left quadriceps: 5/5  Right anterior tibial: 5/5  Left anterior tibial: 5/5  Right gastroc: 5/5  Left gastroc: 5/5  Right EHL 5/5  Left EHL 5/5       Sensory Exam   Right arm light touch: normal  Left arm light touch: normal  Right leg light touch: normal  Left leg light touch: decreased from knee    Gait, Coordination, and Reflexes     Gait  Gait: normal    Tremor   Resting tremor: absent  Intention tremor: absent  Action tremor: absent    Reflexes   Right brachioradialis: 1+  Left brachioradialis: 1+  Right biceps: 1+  Left biceps: 1+  Right triceps: 1+  Left triceps: 1+  Right patellar: 1+  Left patellar: 1+  Right achilles: 1+  Left achilles: 1+  Right plantar: normal  Left plantar: normal  Right Rankin: absent  Left Rankin: absent  Right ankle clonus: absent  Left ankle clonus: absent  Right pendular knee jerk: absent  Left pendular knee jerk: absent    (12 bullet pts)    Results Review:   No radiology results for the last 30 days.    6/15/2020.  X-rays of the cervical spine show reversal of the normal cervical lordosis, no acute fractures or listhesis, multilevel degenerative changes.       6/18/2020 MRI of the cervical spine shows a subtle reversal of the normal cervical lordosis, no acute fractures, listhesis, or T2 signal change within the central cord, multilevel degenerative changes resulting in thecal sac compression that appears worse at C5-6; no significant central  canal or foraminal stenosis noted within the cervical spine.         9/14/2020           9/14/2020      10/2021 -imaging from her [facility] shows a stable 55 degree lumbar curve.  These are labeled to standing films.        Prominent rotoscoliosis of the thoracolumbar spine. Curvature is to the right within the thoracic region with the apex of curvature at the T6/T7 level measuring approximately 27 degrees. Curvature is to the left in the lumbar region with the apex at the L2 level measuring approximately 57 degrees. Moderate degenerative disc narrowing and the lumbar region.     Assessment/Plan: Sara Foy is a 46 y.o. female with a significant medical history of untreated idiopathic scoliosis and obesity.  She presents with a known problem of primarily thoracic and lumbar back pain with left leg pain known scoliosis and new right-sided spasmodic back pain. DERRICK: 62, 57.99, 48, 52.  Physical exam findings of neurologically intact. Her imaging: Prior MRI of the cervical spine shows multilevel degenerative changes resulting in areas of thecal sac compression without significant central canal or foraminal stenosis.    Thoracic and lumbar MRI showed no evidence of clinically significant compression.  Her scoliosis imaging shows a stable 55 degree curve.     Recommendations:  Thoracic back pain   Lumbar back and left leg pain in the L5 distribution  Intermittent numbness and tingling to the left lower extremity (EMG/NCS neg)  Untreated or Late-onset Idiopathic Scoliosis    The evaluation management of late onset adult idiopathic scoliosis (LIS) can present a significant challenge.  With aging, many patients with untreated adolescent idiopathic scoliosis have minimal or no symptoms related to scoliosis.  In a subgroup of patients, however, aging is accompanied by degenerative changes at the facet joints and disc that eventually result in progression of the scoliotic curve, pain, or neurological  impingement.    Natural History of LIS  Several studies have disprove the notion that all types of idiopathic scoliosis inevitably ended disability.  Gabriel compared 215 untreated LIS patients and compared them to 100 controls with 20 years follow-up.  71% of the group had Hendricks angles greater than 50 degrees.   The vast majority of these patients were found to have productive lives with minimal disability.  [facility] 50-year follow-up study showed no difference in survival but there was an increased likelihood of difficulty breathing in individuals with Hendricks angles greater than 80 degrees.  61% of scoliosis patients reported chronic back pain compared with 35% of controls.  However there was no significant difference in clinical depression.      Progression  Korovessis identified risk factors for progression of the curve which included 1) curves >30 degrees, 2) > 30% apical vertebral rotation, 3) 6mm or more of apical listhesis, 4) degenerative disc disease at the lumbosacral junction.  [facility] 50-year follow-up study showed interestingly 70% of patients with untreated AIS had progression of the curves after skeletal maturity.  Thoracic curves greater than 50 degrees progressed on an average of 1 degree/year whereas thoracolumbar curve is progressed by approximately half a degree per year and lumbar curves progressed by a quarter of a degree per year.  But thoracic curve is less than 30 degrees did not show any propensity for progression.      Surgical Decision Making  Recent studies of adult scoliosis of attempted to correlate radiographic appearance with clinical symptoms, but this correlation has been inconsistent compared to its adolescent counterpart.  In general neither the Hendricks angle nor age correlate with symptoms.  Indications for surgery include the following ...  1. Positive sagittal balance - Franck showed adult spinal deformity patient's positive sagittal balance was the most reliable predictor of  clinical symptoms in both naïve patients and those who had previously undergone surgery.  2. Coronal imbalance > 4cm -is associated with poor pain and functional scores for patients without prior surgery.    Sara fortunately does not exhibit positive sagittal balance or coronal imbalance.  Based on this I would recommend continued conservative management.  I would like AP and lateral scoliosis films today and repeat in 1 year with follow-up with my nurse practitioner.  She should continue with pain management.  I encouraged her to reestablish care with both chiropractic and tissue massage.     Obese Class III extreme obesity: > or equal to 40kg/m2  Body mass index is 41.97 kg/m².  Information on the DASH diet provided in the AVS.  We will continue to provided diet and exercise information with the goal of weight loss at each scheduled appointment.       1. Adolescent idiopathic scoliosis, unspecified spinal region    2. Pain in thoracic spine    3. Lumbar back pain    4. Pain of left lower extremity    5. Class 3 severe obesity due to excess calories with serious comorbidity and body mass index (BMI) of 40.0 to 44.9 in adult ([facility])        Recommendations:  Diagnoses and all orders for this visit:    1. Adolescent idiopathic scoliosis, unspecified spinal region (Primary)    2. Pain in thoracic spine    3. Lumbar back pain    4. Pain of left lower extremity    5. Class 3 severe obesity due to excess calories with serious comorbidity and body mass index (BMI) of 40.0 to 44.9 in adult ([facility])        Return in about 1 year (around 11/1/2022) for with JOSE LUIS Rios-JULIANN's same day.    I spent 30 minutes caring for Sara on this date of service. This time includes time spent by me in the following activities: preparing for the visit, reviewing tests, obtaining and/or reviewing a separately obtained history, performing a medically appropriate examination and/or evaluation, counseling and educating the patient/family/caregiver,  ordering medications, tests, or procedures, referring and communicating with other health care professionals, documenting information in the medical record, independently interpreting results and communicating that information with the patient/family/caregiver, and/or care coordination.       Thank you, for allowing me to continue to participate in the care of this patient.    Sincerely,  Flash Houston MD

## 2021-12-10 ENCOUNTER — TELEPHONE (OUTPATIENT)
Dept: NEUROSURGERY | Facility: CLINIC | Age: 46
End: 2021-12-10

## 2021-12-10 NOTE — TELEPHONE ENCOUNTER
Imaging cd from [facility] mailed back to patient per note on cd cover slip.

## 2022-07-10 ENCOUNTER — TRANSCRIBE ORDERS (OUTPATIENT)
Dept: ADMINISTRATIVE | Facility: HOSPITAL | Age: 47
End: 2022-07-10

## 2022-07-10 DIAGNOSIS — M54.12 RADICULOPATHY, CERVICAL REGION: Primary | ICD-10-CM

## 2022-07-26 ENCOUNTER — HOSPITAL ENCOUNTER (OUTPATIENT)
Dept: MRI IMAGING | Facility: HOSPITAL | Age: 47
Discharge: HOME | End: 2022-07-26
Admitting: NURSE PRACTITIONER

## 2022-07-26 DIAGNOSIS — M54.12 RADICULOPATHY, CERVICAL REGION: ICD-10-CM

## 2022-07-26 PROCEDURE — 72141 MRI NECK SPINE W/O DYE: CPT

## 2022-10-25 ENCOUNTER — HOSPITAL ENCOUNTER (OUTPATIENT)
Dept: GENERAL RADIOLOGY | Facility: HOSPITAL | Age: 47
Discharge: HOME | End: 2022-10-25
Admitting: NEUROLOGICAL SURGERY

## 2022-10-25 PROCEDURE — 72082 X-RAY EXAM ENTIRE SPI 2/3 VW: CPT

## 2022-11-01 ENCOUNTER — OFFICE VISIT (OUTPATIENT)
Dept: NEUROSURGERY | Facility: CLINIC | Age: 47
End: 2022-11-01

## 2022-11-01 VITALS — WEIGHT: 246.5 LBS | HEIGHT: 66 IN | BODY MASS INDEX: 39.62 KG/M2

## 2022-11-01 DIAGNOSIS — M79.605 LEFT LEG PAIN: ICD-10-CM

## 2022-11-01 DIAGNOSIS — R20.0 NUMBNESS AND TINGLING OF LEFT LOWER EXTREMITY: ICD-10-CM

## 2022-11-01 DIAGNOSIS — M54.6 CHRONIC LEFT-SIDED THORACIC BACK PAIN: ICD-10-CM

## 2022-11-01 DIAGNOSIS — M54.50 LUMBAR BACK PAIN: ICD-10-CM

## 2022-11-01 DIAGNOSIS — E66.09 CLASS 2 OBESITY DUE TO EXCESS CALORIES WITH BODY MASS INDEX (BMI) OF 39.0 TO 39.9 IN ADULT, UNSPECIFIED WHETHER SERIOUS COMORBIDITY PRESENT: ICD-10-CM

## 2022-11-01 DIAGNOSIS — G89.29 CHRONIC LEFT-SIDED THORACIC BACK PAIN: ICD-10-CM

## 2022-11-01 DIAGNOSIS — R20.2 NUMBNESS AND TINGLING OF LEFT LOWER EXTREMITY: ICD-10-CM

## 2022-11-01 DIAGNOSIS — M41.129 ADOLESCENT IDIOPATHIC SCOLIOSIS, UNSPECIFIED SPINAL REGION: Primary | ICD-10-CM

## 2022-11-01 PROCEDURE — 99214 OFFICE O/P EST MOD 30 MIN: CPT | Performed by: NURSE PRACTITIONER

## 2022-11-01 RX ORDER — DICLOFENAC SODIUM 30 MG/G
GEL TOPICAL
COMMUNITY
Start: 2022-05-24

## 2022-11-01 NOTE — PROGRESS NOTES
Chief complaint:   Chief Complaint   Patient presents with   • Back Pain     PT is here for followup for back pain.     Subjective     HPI:   Last encounter: 11/1/2021    Interval History: Sara Foy is a 47 y.o.  female who presents today for continued evaluation of thoracic and lumbar back pain secondary to idiopathic scoliosis.  Ms. Foy has done fairly well since we last saw her.  She is continued weekly chiropractic care with [facility] out of [facility], pain management with Dr. Pringle where she recently received a left lumbar injection with improvement in her left leg pain, as well as 22 sessions of physical therapy.    Despite conservative management she continues to complain of intermittent mid thoracic back pain that radiates to the left lateral ribs, as well as persistent lumbar back pain that intermittently radiates down the lateral left thigh, anterior leg, extending to digits 2-3 of the left foot.  She additionally reports numbness and tingling in the same distribution on the left, as well as intermittent numbness and tingling to digit 5 of the right foot.  Her back and left leg pain worsen with prolonged sitting, standing, walking.  Alleviating factors include lying down, conservative management, and diclofenac gel.  She denies need for assist while ambulating or falls.  She additionally denies fevers, chills, night sweats, unexplained weight loss, saddle anesthesia, or bowel or bladder dysfunction.  She currently rates the severity of her symptoms 6/10.  No additional concerns at this time.    Oswestry Disability Index = 50%   Score   Pain Intensity Fairly severe pain-3   Personal Care I can look after myself but it is slow and painful-2   Lifting Heavy weights off a table-2   Walking Pain prevents > 1 mile-1   Sitting Pain prevents sitting > 30 min-3   Standing Pain limits standing to < 1/2 hr-3   Sleeping Can only sleep < 4 hrs-3   Sex Life (if applicable) Sex  severely restricted by pain-4   Social Life I do not go out as often because of pain-3   Traveling Pain is bad but trips over 2 hrs-2   ([place]and[place] et al, 1980)    SCORE INTERPRETATION OF THE OSWESTRY LBP DISABILITY QUESTIONNAIRE     40-60% Severe disability Pain remains the main problem in this group of patients, but travel, personal care, social life, sexual activity, and sleep are also affected.  These patients require detailed investigation.     ROS  Review of Systems   Constitutional: Negative.    HENT: Negative.    Eyes: Negative.    Respiratory: Negative.    Cardiovascular: Negative.    Gastrointestinal: Negative.    Endocrine: Negative.    Genitourinary: Negative.    Musculoskeletal: Positive for back pain.   Skin: Negative.    Allergic/Immunologic: Negative.    Neurological: Positive for numbness.   Hematological: Negative.    Psychiatric/Behavioral: Negative.    All other systems reviewed and are negative.    PFSH:  Past Medical History:   Diagnosis Date   • Asthma    • Chronic pain    • DVT (deep venous thrombosis) (HCC)    • GERD (gastroesophageal reflux disease)    • High blood pressure    • Low back pain    • Peptic ulcer    • Scoliosis    • Skin lesion      Past Surgical History:   Procedure Laterality Date   • FLAP HEAD/NECK N/A 3/10/2021    Procedure: possible flap or graft;  Surgeon: Andrea Ceja MD;  Location:  [facility] OR;  Service: ENT;  Laterality: N/A;   • HEAD/NECK LESION/CYST EXCISION N/A 3/10/2021    Procedure: Excision of squamous cell carcinoma in situ of the of the forehead\glabella with transposition flap;  Surgeon: Andrea Ceja MD;  Location:  [facility] OR;  Service: ENT;  Laterality: N/A;   • HYSTERECTOMY  2016   • VASCULAR SURGERY       Objective      Current Outpatient Medications   Medication Sig Dispense Refill   • Diclofenac Sodium 3 % gel gel      • fluticasone (FLONASE) 50 MCG/ACT nasal spray 2 sprays into the nostril(s) as directed by provider Daily As Needed.     •  "omeprazole (priLOSEC) 40 MG capsule Take 40 mg by mouth Daily As Needed.     • Sucralfate (CARAFATE PO) Take 1 g by mouth Daily As Needed.     • celecoxib (CeleBREX) 100 MG capsule celecoxib 100 mg capsule     • chlorthalidone (HYGROTON) 25 MG tablet chlorthalidone 25 mg tablet   Take 1 tablet every day by oral route.     • Cholecalciferol 50 MCG (2000 UT) capsule D3-2000 50 mcg (2,000 unit) capsule   Take 1 capsule every day by oral route.     • cyclobenzaprine (FLEXERIL) 10 MG tablet cyclobenzaprine 10 mg tablet   as needed     • HYDROcodone-acetaminophen (NORCO) 7.5-325 MG per tablet Every 8 (Eight) Hours As Needed.     • potassium chloride (K-DUR,KLOR-CON) 20 MEQ CR tablet potassium chloride ER 20 mEq tablet,extended release   Take 1 tablet every day by oral route.       No current facility-administered medications for this visit.     Vital Signs  Ht 167.6 cm (66\")   Wt 112 kg (246 lb 8 oz)   BMI 39.79 kg/m²   Physical Exam  Vitals and nursing note reviewed.   Constitutional:       General: She is not in acute distress.     Appearance: Normal appearance. She is well-developed and well-groomed. She is obese. She is not ill-appearing, toxic-appearing or diaphoretic.      Comments: BMI 39.79   HENT:      Head: Normocephalic and atraumatic.      Right Ear: Hearing normal.      Left Ear: Hearing normal.   Eyes:      Extraocular Movements: EOM normal.      Conjunctiva/sclera: Conjunctivae normal.      Pupils: Pupils are equal, round, and reactive to light.   Neck:      Trachea: Trachea normal.   Cardiovascular:      Rate and Rhythm: Normal rate and regular rhythm.   Pulmonary:      Effort: Pulmonary effort is normal. No tachypnea, bradypnea, accessory muscle usage or respiratory distress.   Abdominal:      Palpations: Abdomen is soft.   Musculoskeletal:      Cervical back: Full passive range of motion without pain and neck supple.   Skin:     General: Skin is warm and dry.   Neurological:      Mental Status: She is " alert and oriented to person, place, and time.      GCS: GCS eye subscore is 4. GCS verbal subscore is 5. GCS motor subscore is 6.      Gait: Gait is intact.      Deep Tendon Reflexes:      Reflex Scores:       Tricep reflexes are 1+ on the right side and 1+ on the left side.       Bicep reflexes are 1+ on the right side and 1+ on the left side.       Brachioradialis reflexes are 1+ on the right side and 1+ on the left side.       Patellar reflexes are 1+ on the right side and 1+ on the left side.       Achilles reflexes are 1+ on the right side and 1+ on the left side.  Psychiatric:         Speech: Speech normal.         Behavior: Behavior normal. Behavior is cooperative.       Neurologic Exam     Mental Status   Oriented to person, place, and time.   Attention: normal. Concentration: normal.   Speech: speech is normal   Level of consciousness: alert    Cranial Nerves     CN II   Visual fields full to confrontation.     CN III, IV, VI   Pupils are equal, round, and reactive to light.  Extraocular motions are normal.     CN V   Facial sensation intact.     CN VII   Facial expression full, symmetric.     CN VIII   CN VIII normal.     CN IX, X   CN IX normal.     CN XI   CN XI normal.     Motor Exam   Right arm tone: normal  Left arm tone: normal  Right leg tone: normal  Left leg tone: normal    Strength   Right deltoid: 5/5  Left deltoid: 5/5  Right biceps: 5/5  Left biceps: 5/5  Right triceps: 5/5  Left triceps: 5/5  Right wrist extension: 5/5  Left wrist extension: 5/5  Right iliopsoas: 5/5  Left iliopsoas: 5/5  Right quadriceps: 5/5  Left quadriceps: 5/5  Right anterior tibial: 5/5  Left anterior tibial: 5/5  Right gastroc: 5/5  Left gastroc: 5/5  Right EHL -4/5  Left EHL 5/5       Sensory Exam   Right arm light touch: normal  Left arm light touch: normal  Right leg light touch: normal  Sensory deficit distribution on left: L4    Gait, Coordination, and Reflexes     Gait  Gait: normal    Tremor   Resting tremor:  absent  Intention tremor: absent  Action tremor: absent    Reflexes   Right brachioradialis: 1+  Left brachioradialis: 1+  Right biceps: 1+  Left biceps: 1+  Right triceps: 1+  Left triceps: 1+  Right patellar: 1+  Left patellar: 1+  Right achilles: 1+  Left achilles: 1+  Right plantar: equivocal  Left plantar: equivocal  Right Rankin: absent  Left Rankin: absent  Right ankle clonus: absent  Left ankle clonus: absent  Right pendular knee jerk: absent  Left pendular knee jerk: absent  (12 bullet pts)    Results Review:   6/15/2020.  X-rays of the cervical spine show reversal of the normal cervical lordosis, no acute fractures or listhesis, multilevel degenerative changes.       6/18/2020 MRI of the cervical spine shows a subtle reversal of the normal cervical lordosis, no acute fractures, listhesis, or T2 signal change within the central cord, multilevel degenerative changes resulting in thecal sac compression that appears worse at C5-6; no significant central canal or foraminal stenosis noted within the cervical spine.       7/26/2022.  MRI of the cervical spine is relatively unchanged from prior imaging from 6/18/2020.  Reversal of the normal cervical lordosis with mild to moderate thecal sac compression at C4-5 and C5-6; no significant central canal or foraminal stenosis noted throughout the cervical spine.       9/14/2020        9/14/2020       10/2021 -imaging from her [facility] shows a stable 55 degree lumbar curve.  These are labeled to standing films.      Prominent rotoscoliosis of the thoracolumbar spine. Curvature is to the right within the thoracic region with the apex of curvature at the T6/T7 level measuring approximately 27 degrees. Curvature is to the left in the lumbar region with the apex at the L2 level measuring approximately 57 degrees. Moderate degenerative disc narrowing and the lumbar region.    10/25/2022.  Upper thoracic curvature approximately 33° and thoracolumbar curvature is  approximately 58°.        Assessment/Plan: Sara Foy is a 47 y.o. female with a significant medical history of fibromyalgia, chronic back pain secondary to late onset idiopathic scoliosis, hypertension, DVT, peptic ulcer, and obesity.  She presents today with continued complaints of midthoracic and lumbar back pain with intermittent radicular pain to the left lower extremity in the L4 distribution. DERRICK: 50.  Physical exam findings of -4/5 right EHL weakness, gross hyporeflexia and equivocal plantar reflexes, and a well-maintained gait.  Repeat scoliosis imaging shows thoracolumbar curvature at approximately 50 degrees.     Recommendations:  Thoracic back pain   Lumbar back and left leg pain in the L4 distribution  Intermittent numbness and tingling to the left lower extremity  Untreated or Late-onset Idiopathic Scoliosis  Despite continued conservative care with physical therapy, chiropractic care, and pain management, Ms. Foy continues to complain of mid thoracic back pain that radiates to the lateral left ribs, as well as lumbar back pain that radiates to the left leg in the L4 distribution.  For further evaluation we will send her for a noncontrasted MRI of the thoracic and lumbar spine to rule out thoracic or lumbar stenosis that might require surgical intervention.  We will have her return for reassessment with Dr. Houston after all studies have been completed.  I advise she call or return sooner for any new or additional concerns.  Ms. Foy agrees with this plan of care.    Class 2 obesity (BMI 35.0-39.9) due to excessive calories with serious comorbidities BMI of 39.0-39.9 in adult  Body mass index is 39.79 kg/m². Information on the DASH diet provided in the AVS.  We will continue to provided diet and exercise information with the goal of weight loss at each scheduled appointment.     Diagnoses and all orders for this visit:    1. Adolescent idiopathic scoliosis, unspecified spinal region  (Primary)  -     MRI Lumbar Spine Without Contrast; Future  -     MRI Thoracic Spine Without Contrast; Future    2. Chronic left-sided thoracic back pain  -     MRI Thoracic Spine Without Contrast; Future    3. Lumbar back pain  -     MRI Lumbar Spine Without Contrast; Future    4. Left leg pain  -     MRI Lumbar Spine Without Contrast; Future    5. Numbness and tingling of left lower extremity  -     MRI Lumbar Spine Without Contrast; Future    6. Class 2 obesity due to excess calories with body mass index (BMI) of 39.0 to 39.9 in adult, unspecified whether serious comorbidity present      Return for FOLLOW UP WITH DR. JOYNER NEXT AVAILABLE.    Level of Risk: Moderate due to: mild exacerbation of one chronic illness and undiagnosed new problem  MDM: Moderate  (Mod = 89849, High = 93658)    Thank you, for allowing me to continue to participate in the care of this patient.    Sincerely,  MITCH Galindo

## 2022-11-21 ENCOUNTER — HOSPITAL ENCOUNTER (OUTPATIENT)
Dept: MRI IMAGING | Facility: HOSPITAL | Age: 47
Discharge: HOME | End: 2022-11-21

## 2022-11-21 DIAGNOSIS — M41.129 ADOLESCENT IDIOPATHIC SCOLIOSIS, UNSPECIFIED SPINAL REGION: ICD-10-CM

## 2022-11-21 DIAGNOSIS — R20.2 NUMBNESS AND TINGLING OF LEFT LOWER EXTREMITY: ICD-10-CM

## 2022-11-21 DIAGNOSIS — M54.50 LUMBAR BACK PAIN: ICD-10-CM

## 2022-11-21 DIAGNOSIS — G89.29 CHRONIC LEFT-SIDED THORACIC BACK PAIN: ICD-10-CM

## 2022-11-21 DIAGNOSIS — R20.0 NUMBNESS AND TINGLING OF LEFT LOWER EXTREMITY: ICD-10-CM

## 2022-11-21 DIAGNOSIS — M79.605 LEFT LEG PAIN: ICD-10-CM

## 2022-11-21 DIAGNOSIS — M54.6 CHRONIC LEFT-SIDED THORACIC BACK PAIN: ICD-10-CM

## 2022-11-21 PROCEDURE — 72146 MRI CHEST SPINE W/O DYE: CPT

## 2022-11-21 PROCEDURE — 72148 MRI LUMBAR SPINE W/O DYE: CPT

## 2022-11-30 ENCOUNTER — DOCUMENTATION (OUTPATIENT)
Dept: NEUROSURGERY | Facility: CLINIC | Age: 47
End: 2022-11-30

## 2023-02-27 ENCOUNTER — TELEPHONE (OUTPATIENT)
Dept: NEUROSURGERY | Facility: CLINIC | Age: 48
End: 2023-02-27

## 2023-02-27 NOTE — TELEPHONE ENCOUNTER
The [facility] received a fax that requires your attention. The document has been indexed to the patient’s chart for your review.    Reason for sending: MEDICAL RECORDS NOTIFICATION    Documents Description: [facility]_09/19/22    Name of Sender: OLU DURAND    Date Indexed: 02/27/23     Notes (if needed): EMG REPORT

## 2023-03-01 ENCOUNTER — OFFICE VISIT (OUTPATIENT)
Dept: NEUROSURGERY | Facility: CLINIC | Age: 48
End: 2023-03-01
Payer: MEDICAID

## 2023-03-01 VITALS — WEIGHT: 252 LBS | BODY MASS INDEX: 40.5 KG/M2 | HEIGHT: 66 IN

## 2023-03-01 DIAGNOSIS — M54.17 LUMBOSACRAL RADICULOPATHY AT L5: ICD-10-CM

## 2023-03-01 DIAGNOSIS — M41.129 ADOLESCENT IDIOPATHIC SCOLIOSIS, UNSPECIFIED SPINAL REGION: Primary | ICD-10-CM

## 2023-03-01 DIAGNOSIS — M51.37 DEGENERATIVE DISC DISEASE AT L5-S1 LEVEL: ICD-10-CM

## 2023-03-01 DIAGNOSIS — E66.01 CLASS 3 SEVERE OBESITY DUE TO EXCESS CALORIES WITH SERIOUS COMORBIDITY AND BODY MASS INDEX (BMI) OF 40.0 TO 44.9 IN ADULT: ICD-10-CM

## 2023-03-01 PROBLEM — Z78.9 NON-SMOKER: Status: ACTIVE | Noted: 2023-03-01

## 2023-03-01 PROCEDURE — 99214 OFFICE O/P EST MOD 30 MIN: CPT | Performed by: NEUROLOGICAL SURGERY

## 2023-03-01 NOTE — PROGRESS NOTES
Chief complaint:   Chief Complaint   Patient presents with   • Scoliosis     Patient here for follow up.       Subjective     HPI:   Interval History: Sara is a 47-year-old female with a known history of late adolescent idiopathic scoliosis.  She returns today for reevaluation with a new MRI of the thoracic spine and new lumbar imaging.    Currently she complains of 7 out of 10 back pain.  This is spasming mostly in her mid thoracic spine radiating around her left spine.  She states that she has a friend who is in her 80s who they were going to resect a rib on for scoliosis.  I am not familiar with this particular procedure.    She also complains of left lower extremity pain.  She states that she recently had worsening of her left pain due to a left knee injury.    No new bowel or bladder incontinence.  She ambulates independently without a cane or walker.    She currently sees Dr. Pringle for pain management.  He performed an EMG nerve conduction study which showed some evidence of an L5/S1 radiculopathy on the left.  She is here today for review of results.    Oswestry Disability Index Lumbar = 68%  SCORE INTERPRETATION OF THE OSWESTRY LBP DISABILITY QUESTIONNAIRE: 60-80% Crippled Back pain impinges on all aspects of these patients' lives both at home and at work. Positive intervention is required.    Score   Pain Intensity Fairly severe pain-3   Personal Care I need some help but manage most of my personal care-3   Lifting Medium weights off a table-3   Walking Pain prevents > 100 yards-3   Sitting Pain prevents sitting > 1 hr-2   Standing Pain limits standing to < 1/2 hr-3   Sleeping Can only sleep < 4 hrs-3   Sex Life (if applicable) Sex is nearly absent due to pain-5   Social Life I do not go out as often because of pain-3   Traveling Pain is bad but trips over 2 hrs-2   (Aleisha et al, 1980)      Review of Systems   Constitutional: Negative.    HENT: Negative.    Eyes: Negative.    Respiratory:  "Negative.    Cardiovascular: Negative.    Gastrointestinal: Negative.    Endocrine: Negative.    Genitourinary: Negative.    Musculoskeletal: Positive for back pain.   Skin: Negative.    Allergic/Immunologic: Negative.    Neurological: Negative.    Hematological: Negative.    Psychiatric/Behavioral: Negative.        PFSH:  Past Medical History:   Diagnosis Date   • Asthma    • Chronic pain    • DVT (deep venous thrombosis) (HCC)    • GERD (gastroesophageal reflux disease)    • High blood pressure    • Low back pain    • Peptic ulcer    • Scoliosis    • Skin lesion        Past Surgical History:   Procedure Laterality Date   • FLAP HEAD/NECK N/A 3/10/2021    Procedure: possible flap or graft;  Surgeon: Andrea Ceja MD;  Location:  [facility] OR;  Service: ENT;  Laterality: N/A;   • HEAD/NECK LESION/CYST EXCISION N/A 3/10/2021    Procedure: Excision of squamous cell carcinoma in situ of the of the forehead\glabella with transposition flap;  Surgeon: Andrea Ceja MD;  Location:  [facility] OR;  Service: ENT;  Laterality: N/A;   • HYSTERECTOMY  2016   • VASCULAR SURGERY         Objective      Current Outpatient Medications   Medication Sig Dispense Refill   • Diclofenac Sodium 3 % gel gel      • fluticasone (FLONASE) 50 MCG/ACT nasal spray 2 sprays into the nostril(s) as directed by provider Daily As Needed.     • omeprazole (priLOSEC) 40 MG capsule Take 40 mg by mouth Daily As Needed.     • Sucralfate (CARAFATE PO) Take 1 g by mouth Daily As Needed.       No current facility-administered medications for this visit.       Vital Signs  Ht 167.6 cm (66\")   Wt 114 kg (252 lb)   BMI 40.67 kg/m²   Physical Exam  Eyes:      Extraocular Movements: EOM normal.      Pupils: Pupils are equal, round, and reactive to light.   Neurological:      Mental Status: She is oriented to person, place, and time.      Gait: Gait is intact.      Deep Tendon Reflexes:      Reflex Scores:       Tricep reflexes are 1+ on the right side and " 1+ on the left side.       Bicep reflexes are 1+ on the right side and 1+ on the left side.       Brachioradialis reflexes are 1+ on the right side and 1+ on the left side.       Patellar reflexes are 1+ on the right side and 1+ on the left side.       Achilles reflexes are 1+ on the right side and 1+ on the left side.  Psychiatric:         Speech: Speech normal.       Neurologic Exam     Mental Status   Oriented to person, place, and time.   Attention: normal. Concentration: normal.   Speech: speech is normal   Level of consciousness: alert    Cranial Nerves     CN II   Visual fields full to confrontation.     CN III, IV, VI   Pupils are equal, round, and reactive to light.  Extraocular motions are normal.     CN V   Facial sensation intact.     CN VII   Facial expression full, symmetric.     CN VIII   CN VIII normal.     CN IX, X   CN IX normal.     CN XI   CN XI normal.     Motor Exam   Right arm tone: normal  Left arm tone: normal  Right leg tone: normal  Left leg tone: normal    Strength   Right deltoid: 5/5  Left deltoid: 5/5  Right biceps: 5/5  Left biceps: 5/5  Right triceps: 5/5  Left triceps: 5/5  Right wrist extension: 5/5  Left wrist extension: 5/5  Right iliopsoas: 5/5  Left iliopsoas: 5/5  Right quadriceps: 5/5  Left quadriceps: 5/5  Right anterior tibial: 5/5  Left anterior tibial: 5/5  Right gastroc: 5/5  Left gastroc: 5/5  Right EHL -4/5  Left EHL 5/5       Sensory Exam   Right arm light touch: normal  Left arm light touch: normal  Right leg light touch: normal  Sensory deficit distribution on left: L5    Gait, Coordination, and Reflexes     Gait  Gait: normal    Tremor   Resting tremor: absent  Intention tremor: absent  Action tremor: absent    Reflexes   Right brachioradialis: 1+  Left brachioradialis: 1+  Right biceps: 1+  Left biceps: 1+  Right triceps: 1+  Left triceps: 1+  Right patellar: 1+  Left patellar: 1+  Right achilles: 1+  Left achilles: 1+  Right plantar: equivocal  Left plantar:  equivocal  Right Rankin: absent  Left Rankni: absent  Right ankle clonus: absent  Left ankle clonus: absent  Right pendular knee jerk: absent  Left pendular knee jerk: absent    (12 bullet pts)    Results Review:   No radiology results for the last 30 days.    6/15/2020.  X-rays of the cervical spine show reversal of the normal cervical lordosis, no acute fractures or listhesis, multilevel degenerative changes.       6/18/2020 MRI of the cervical spine shows a subtle reversal of the normal cervical lordosis, no acute fractures, listhesis, or T2 signal change within the central cord, multilevel degenerative changes resulting in thecal sac compression that appears worse at C5-6; no significant central canal or foraminal stenosis noted within the cervical spine.          9/14/2020            9/14/2020       10/2021 -imaging from her [facility] shows a stable 55 degree lumbar curve.  These are labeled to standing films.      Noncontrast MRI of the lumbar spine.  Some advanced degeneration L5/S1 asymmetric to the left causing foraminal stenosis at this level.      AP scoliosis x-ray films reviewed.  Comparison of films from 2020, 2021 and 2022 showed no significant change in coronal balance or Hendricks angle.                          Prominent rotoscoliosis of the thoracolumbar spine. Curvature is to the right within the thoracic region with the apex of curvature at the T6/T7 level measuring approximately 27 degrees. Curvature is to the left in the lumbar region with the apex at the L2 level measuring approximately 57 degrees. Moderate degenerative disc narrowing and the lumbar region.     Assessment/Plan: Sara Foy is a 46 y.o. female with a significant medical history of fibromyalgia untreated idiopathic scoliosis and obesity.  She presents with a known problem of primarily thoracic and lumbar back pain with left leg pain known scoliosis and new right-sided spasmodic back pain. DERRICK: 62, 57.99, 48, 52, 58.   Physical exam findings of neurologically intact. Her imaging: Prior MRI of the cervical spine shows multilevel degenerative changes resulting in areas of thecal sac compression without significant central canal or foraminal stenosis.    Thoracic and lumbar MRI showed no evidence of clinically significant central compression.  Her scoliosis imaging shows a stable 55 degree curve which has been stable since 2020.     Recommendations:  Thoracic back pain   Untreated or Late-onset Idiopathic Scoliosis     The evaluation management of late onset adult idiopathic scoliosis (LIS) can present a significant challenge.  With aging, many patients with untreated adolescent idiopathic scoliosis have minimal or no symptoms related to scoliosis.  In a subgroup of patients, however, aging is accompanied by degenerative changes at the facet joints and disc that eventually result in progression of the scoliotic curve, pain, or neurological impingement.    Natural History of LIS  Several studies have disprove the notion that all types of idiopathic scoliosis inevitably ended disability.  Gabriel compared 215 untreated LIS patients and compared them to 100 controls with 20 years follow-up.  71% of the group had Hendricks angles greater than 50 degrees.   The vast majority of these patients were found to have productive lives with minimal disability.  Gorge 50-year follow-up study showed no difference in survival but there was an increased likelihood of difficulty breathing in individuals with Henrdicks angles greater than 80 degrees.  61% of scoliosis patients reported chronic back pain compared with 35% of controls.  However there was no significant difference in clinical depression.       Progression  Korovessis identified risk factors for progression of the curve which included 1) curves >30 degrees, 2) > 30% apical vertebral rotation, 3) 6mm or more of apical listhesis, 4) degenerative disc disease at the lumbosacral junction.  Gorge  50-year follow-up study showed interestingly 70% of patients with untreated AIS had progression of the curves after skeletal maturity.  Thoracic curves greater than 50 degrees progressed on an average of 1 degree/year whereas thoracolumbar curve is progressed by approximately half a degree per year and lumbar curves progressed by a quarter of a degree per year.  But thoracic curve is less than 30 degrees did not show any propensity for progression.       Surgical Decision Making  Recent studies of adult scoliosis of attempted to correlate radiographic appearance with clinical symptoms, but this correlation has been inconsistent compared to its adolescent counterpart.  In general neither the Hendricks angle nor age correlate with symptoms.  Indications for surgery include the following ...  1. Positive sagittal balance - Franck showed adult spinal deformity patient's positive sagittal balance was the most reliable predictor of clinical symptoms in both naïve patients and those who had previously undergone surgery.  2. Coronal imbalance > 4cm -is associated with poor pain and functional scores for patients without prior surgery.     Sara fortunately does not exhibit positive sagittal balance or coronal imbalance.  Based on this I would recommend continued conservative management.  Unfortunately a diagnosis of fibromyalgia is a contraindication to scoliosis correction.  I encouraged her to reestablish care with both chiropractic and tissue massage.     Degenerative disc disease L5/S1  EMG/NCS with left L5 radiculopathy, chronic  Sara and I discussed her history of presenting illness, physical exam and imaging findings.  Her MRI of her lumbar spine does show some advanced degeneration asymmetric to the left with foraminal stenosis but no central stenosis at L5/S1.  Additionally she has some evidence of radiculopathy on her EMG nerve conduction study.  However she is full strength in her left lower extremity.  Based  on the fact that she has a complicated spinal anatomy given her scoliosis and her predicted poor outcome for spinal fusion based on fibromyalgia I would not recommend surgical intervention.  -Maximize conservative care  - No further surgical evaluation necessary unless new pathology identified.    Obese Class III extreme obesity: > or equal to 40kg/m2  Body mass index is 41 kg/m².  Information on the DASH diet provided in the AVS.  We will continue to provided diet and exercise information with the goal of weight loss at each scheduled appointment.       1. Adolescent idiopathic scoliosis, unspecified spinal region    2. Class 3 severe obesity due to excess calories with serious comorbidity and body mass index (BMI) of 40.0 to 44.9 in adult (HCC)        Recommendations:  Diagnoses and all orders for this visit:    1. Adolescent idiopathic scoliosis, unspecified spinal region (Primary)    2. Class 3 severe obesity due to excess calories with serious comorbidity and body mass index (BMI) of 40.0 to 44.9 in adult (HCC)        Return if symptoms worsen or fail to improve.    I spent 30 minutes caring for Sara on this date of service. This time includes time spent by me in the following activities: preparing for the visit, reviewing tests, obtaining and/or reviewing a separately obtained history, performing a medically appropriate examination and/or evaluation, counseling and educating the patient/family/caregiver, ordering medications, tests, or procedures, referring and communicating with other health care professionals, documenting information in the medical record, independently interpreting results and communicating that information with the patient/family/caregiver, and/or care coordination.       Thank you, for allowing me to continue to participate in the care of this patient.    Sincerely,  Flash Houston MD

## 2023-04-03 ENCOUNTER — OFFICE VISIT (OUTPATIENT)
Dept: OBGYN CLINIC | Age: 48
End: 2023-04-03
Payer: MEDICAID

## 2023-04-03 VITALS
WEIGHT: 253 LBS | DIASTOLIC BLOOD PRESSURE: 78 MMHG | BODY MASS INDEX: 39.71 KG/M2 | SYSTOLIC BLOOD PRESSURE: 130 MMHG | HEIGHT: 67 IN

## 2023-04-03 DIAGNOSIS — Z11.51 SCREENING FOR HPV (HUMAN PAPILLOMAVIRUS): ICD-10-CM

## 2023-04-03 DIAGNOSIS — Z12.31 ENCOUNTER FOR SCREENING MAMMOGRAM FOR BREAST CANCER: ICD-10-CM

## 2023-04-03 DIAGNOSIS — Z01.419 ENCOUNTER FOR ANNUAL ROUTINE GYNECOLOGICAL EXAMINATION: ICD-10-CM

## 2023-04-03 DIAGNOSIS — N39.3 STRESS INCONTINENCE OF URINE: ICD-10-CM

## 2023-04-03 DIAGNOSIS — N89.8 VAGINAL DISCHARGE: ICD-10-CM

## 2023-04-03 DIAGNOSIS — Z76.89 ENCOUNTER TO ESTABLISH CARE WITH NEW DOCTOR: ICD-10-CM

## 2023-04-03 DIAGNOSIS — Z76.89 ENCOUNTER TO ESTABLISH CARE: Primary | ICD-10-CM

## 2023-04-03 LAB
BILIRUB UR QL STRIP: NEGATIVE
CLARITY UR: CLEAR
COLOR UR: YELLOW
GLUCOSE UR STRIP.AUTO-MCNC: NEGATIVE MG/DL
HGB UR STRIP.AUTO-MCNC: NEGATIVE MG/L
KETONES UR STRIP.AUTO-MCNC: NEGATIVE MG/DL
LEUKOCYTE ESTERASE UR QL STRIP.AUTO: NEGATIVE
NITRITE UR QL STRIP.AUTO: NEGATIVE
PH UR STRIP.AUTO: 6 [PH] (ref 5–8)
PROT UR STRIP.AUTO-MCNC: NEGATIVE MG/DL
SP GR UR STRIP.AUTO: 1.01 (ref 1–1.03)
UROBILINOGEN UR STRIP.AUTO-MCNC: 0.2 E.U./DL

## 2023-04-03 PROCEDURE — 99386 PREV VISIT NEW AGE 40-64: CPT | Performed by: OBSTETRICS & GYNECOLOGY

## 2023-04-03 RX ORDER — NICOTINE POLACRILEX 4 MG/1
20 GUM, CHEWING ORAL DAILY
COMMUNITY

## 2023-04-03 RX ORDER — ONDANSETRON 4 MG/1
4 TABLET, FILM COATED ORAL EVERY 8 HOURS PRN
COMMUNITY

## 2023-04-03 ASSESSMENT — ENCOUNTER SYMPTOMS
RESPIRATORY NEGATIVE: 1
EYES NEGATIVE: 1
GASTROINTESTINAL NEGATIVE: 1

## 2023-04-05 LAB — BACTERIA UR CULT: NORMAL

## 2023-05-01 ENCOUNTER — TRANSCRIBE ORDERS (OUTPATIENT)
Dept: ADMINISTRATIVE | Facility: HOSPITAL | Age: 48
End: 2023-05-01
Payer: MEDICAID

## 2023-05-01 DIAGNOSIS — M25.362 PATELLAR INSTABILITY OF LEFT KNEE: Primary | ICD-10-CM

## 2023-05-01 DIAGNOSIS — M25.462 SWELLING OF LEFT KNEE JOINT: ICD-10-CM

## 2023-05-23 ENCOUNTER — HOSPITAL ENCOUNTER (OUTPATIENT)
Dept: MRI IMAGING | Facility: HOSPITAL | Age: 48
Discharge: HOME | End: 2023-05-23
Admitting: NURSE PRACTITIONER
Payer: MEDICAID

## 2023-05-23 DIAGNOSIS — M25.462 SWELLING OF LEFT KNEE JOINT: ICD-10-CM

## 2023-05-23 DIAGNOSIS — M25.362 PATELLAR INSTABILITY OF LEFT KNEE: ICD-10-CM

## 2023-05-23 PROCEDURE — 73721 MRI JNT OF LWR EXTRE W/O DYE: CPT

## 2023-06-02 ENCOUNTER — HOSPITAL ENCOUNTER (OUTPATIENT)
Dept: WOMENS IMAGING | Age: 48
Discharge: HOME | End: 2023-06-02
Payer: MEDICAID

## 2023-06-02 DIAGNOSIS — Z12.31 ENCOUNTER FOR SCREENING MAMMOGRAM FOR BREAST CANCER: ICD-10-CM

## 2023-06-02 PROCEDURE — 77063 BREAST TOMOSYNTHESIS BI: CPT

## 2023-12-06 ENCOUNTER — OFFICE VISIT (OUTPATIENT)
Dept: OTOLARYNGOLOGY | Facility: CLINIC | Age: 48
End: 2023-12-06
Payer: MEDICAID

## 2023-12-06 VITALS
DIASTOLIC BLOOD PRESSURE: 92 MMHG | WEIGHT: 251 LBS | BODY MASS INDEX: 40.34 KG/M2 | TEMPERATURE: 98.6 F | HEART RATE: 73 BPM | HEIGHT: 66 IN | SYSTOLIC BLOOD PRESSURE: 144 MMHG

## 2023-12-06 DIAGNOSIS — H92.03 OTALGIA OF BOTH EARS: ICD-10-CM

## 2023-12-06 DIAGNOSIS — R40.0 DAYTIME SOMNOLENCE: ICD-10-CM

## 2023-12-06 DIAGNOSIS — R53.83 FATIGUE, UNSPECIFIED TYPE: ICD-10-CM

## 2023-12-06 DIAGNOSIS — R06.83 SNORING: Primary | ICD-10-CM

## 2023-12-06 DIAGNOSIS — R09.81 NASAL CONGESTION: ICD-10-CM

## 2023-12-06 DIAGNOSIS — H91.93 DECREASED HEARING OF BOTH EARS: ICD-10-CM

## 2023-12-06 RX ORDER — DIPHENOXYLATE HYDROCHLORIDE AND ATROPINE SULFATE 2.5; .025 MG/1; MG/1
1 TABLET ORAL DAILY
COMMUNITY

## 2023-12-06 RX ORDER — ALBUTEROL SULFATE 90 UG/1
2 AEROSOL, METERED RESPIRATORY (INHALATION)
COMMUNITY

## 2023-12-06 RX ORDER — AZELASTINE 1 MG/ML
2 SPRAY, METERED NASAL 2 TIMES DAILY
Qty: 30 ML | Refills: 11 | Status: SHIPPED | OUTPATIENT
Start: 2023-12-06

## 2023-12-06 RX ORDER — ERGOCALCIFEROL 1.25 MG/1
50000 CAPSULE ORAL WEEKLY
COMMUNITY

## 2023-12-06 NOTE — PATIENT INSTRUCTIONS
For the best response, use your nasal sprays every day without skipping doses. It may take several weeks before the full effect is acheived.  Gastroesophageal Reflux Disease (Laryngopharyngeal Reflux), Adult  Gastroesophageal reflux disease (GERD) and/or Laryngopharyngeal Reflux, (LPR) happens when acid from your stomach flows up into the esophagus and/or throat and voicebox or larynx. When acid comes in contact with the these organs, the acid can cause soreness (inflammation). Over time, GERD may create small holes (ulcers) in the lining of the esophagus and may lead to the development of hoarseness, difficulty swallowing,   feeling of something stuck in the throat, increased mucous or drainage and even predispose to the development of malignancies, (cancer).    CAUSES   Increased body weight. This puts pressure on the stomach, making acid rise from the stomach into the esophagus.  Smoking. This increases acid production in the stomach.  Drinking alcohol. This causes decreased pressure in the lower esophageal sphincter (valve or ring of muscle between the esophagus and stomach), allowing acid from the stomach into the esophagus.  Late evening meals and a full stomach. This increases pressure and acid production in the stomach.  A malformed lower esophageal sphincter  Diet which can include avoidance of gluten and dairy products  Age  SYMPTOMS   Burning pain in the lower part of the mid-chest behind the breastbone and in the mid-stomach area. This may occur twice a week or more often.  Trouble swallowing.  Sore throat.  Dry cough.  Asthma-like symptoms including chest tightness, shortness of breath, or wheezing.  Globus sensation-something stuck in the throat/fullness  Hoarseness  DIAGNOSIS   Your caregiver may be able to diagnose GERD based on your symptoms. In some cases, X-rays and other tests may be done to check for complications or to check the condition of your stomach and esophagus.  You may need to see  another doctor.  TREATMENT   Over-the-counter or prescription medicines to help decrease acid production.   Dietary and behavioral modifications or changes may be also recommended.  HOME CARE INSTRUCTIONS   Change the factors that you can control. Ask your caregiver for guidance concerning weight loss, quitting smoking, and alcohol consumption.  Avoid foods and drinks that make your symptoms worse, and MAY include such as:  Caffeine or alcoholic drinks.  Chocolate.  Gluten containing foods  Dairy  Peppermint or mint flavorings.  Garlic and onions.  Spicy foods.  Citrus fruits, such as oranges, apple, or limes.  Tomato-based foods such as sauce, chili, salsa, and pizza.  Fried and fatty foods.  Avoid lying down for the 3 hours prior to your bedtime or prior to taking a nap.  Eat small, frequent meals instead of large meals.  Wear loose-fitting clothing. Do not wear anything tight around your waist that causes pressure on your stomach.  Raise the head of your bed 6 to 8 inches with wood blocks to help you sleep. Extra pillows will not help.  Only take over-the-counter or prescription medicines for pain, discomfort, or fever as directed by your caregiver.  Do not take aspirin, ibuprofen, or other nonsteroidal anti-inflammatory drugs if possible (NSAIDs).  SEEK IMMEDIATE MEDICAL CARE IF:   You have pain in your arms, neck, jaw, teeth, or back.  Your pain increases or changes in intensity or duration.  You develop nausea, vomiting, or sweating (diaphoresis).  You develop shortness of breath, or you faint.  Your vomit is green, yellow, black, or looks like coffee grounds or blood.  Your stool is red, bloody, or black.  These symptoms could be signs of other problems, such as heart disease, gastric bleeding, or esophageal bleeding.  MAKE SURE YOU:   Understand these instructions.  Will watch your condition.  Will get help right away if you are not doing well or get worse.     This information is not intended to replace  advice given to you by your physician. Make sure you discuss any questions you have with your health care provider.     Modified by Andrea Ceja MD, FACS 9/8/2016.  Document Released: 09/27/2006 Document Revised: 01/08/2016 Document Reviewed: 04/13/2016  [employer] Interactive Patient Education ©2016 [employer]

## 2023-12-06 NOTE — PROGRESS NOTES
YOB: 1975  Location: [facility]  Location Address: [address] [place] [address], [facility], [address] [place] [address]  Location Phone: [phone]    Chief Complaint   Patient presents with    Earache     FOLLOW UP  EVERY OTHER MONTH HAS EAR ACHES RIGHT IS MORE FREQUENT       History of Present Illness  Sara Foy is a 48 y.o. female.  Sara Foy is here for evaluation of ENT complaints. The patient has had problems with bilateral ear pain   Patient states she has had frequent ear pain over the past several months   She states she has ear pain that radiates down into her neck and intermittent drainage   She reports hearing changes during these episodes as well.   Patient states she has felt like her hearing has changed over the past several years   Patient takes omeprazole twice daily as needed - she does not take frequently     She has been using flonase and nasonex at times       She complains of snoring at night as well as daytime somnolence - she has not had sleep study     EPWORTH: 10     Past Medical History:   Diagnosis Date    Asthma     Chronic pain     DVT (deep venous thrombosis)     GERD (gastroesophageal reflux disease)     High blood pressure     Low back pain     Peptic ulcer     Scoliosis     Skin lesion        Past Surgical History:   Procedure Laterality Date    FLAP HEAD/NECK N/A 3/10/2021    Procedure: possible flap or graft;  Surgeon: Andrea Ceja MD;  Location: [facility] OR;  Service: ENT;  Laterality: N/A;    HEAD/NECK LESION/CYST EXCISION N/A 3/10/2021    Procedure: 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      Diclofenac Sodium 3 % gel gel       ergocalciferol (ERGOCALCIFEROL) 1.25 MG (43860 UT) capsule Take 1 capsule by mouth 1 (One) Time Per Week.      fluticasone (FLONASE) 50 MCG/ACT nasal spray 2 sprays into the nostril(s) as directed by provider Daily As Needed.      multivitamin (THERAGRAN) tablet tablet Take 1 tablet by mouth Daily.      omeprazole (priLOSEC) 40 MG capsule Take 1 capsule by mouth Daily As Needed.      Sucralfate (CARAFATE PO) Take 1 g by mouth Daily As Needed.      VITAMIN D, CHOLECALCIFEROL, PO Take 1 tablet by mouth Daily.         Penicillins, Duloxetine hcl, Doxycycline, Iodinated contrast media, and Contrast dye (echo or unknown ct/mr)    Family History   Problem Relation Age of Onset    No Known Problems Mother     No Known Problems Father        Social History     Socioeconomic History    Marital status:    Tobacco Use    Smoking status: Never    Smokeless tobacco: Never   Vaping Use    Vaping Use: Never used   Substance and Sexual Activity    Alcohol use: Yes     Comment: socially    Drug use: Not Currently     Comment: no recent substance abuse, only in her teens    Sexual activity: Defer       Review of Systems   Constitutional: Negative.    HENT:  Positive for congestion, ear pain and hearing loss.        Vitals:    12/06/23 1408   BP: 144/92   Pulse: 73   Temp: 98.6 °F (37 °C)       Body mass index is 40.53 kg/m².    Objective     Physical Exam  Vitals reviewed.   Constitutional:       Appearance: She is obese.   HENT:      Head: Normocephalic.      Right Ear: External ear normal. Tympanic membrane is retracted.      Left Ear: External ear normal. Tympanic membrane is retracted.      Nose: Septal deviation present.      Mouth/Throat:      Lips: Pink.      Mouth: Mucous membranes are moist.      Comments: Amato III  Neurological:      Mental Status: She is alert.         Assessment & Plan   Diagnoses and all orders  for this visit:    1. Snoring (Primary)  -     Polysomnography 4 or More Parameters; Future    2. Fatigue, unspecified type  -     Polysomnography 4 or More Parameters; Future    3. Daytime somnolence  -     Polysomnography 4 or More Parameters; Future    4. Otalgia of both ears    5. Decreased hearing of both ears  -     Comprehensive Hearing Test; Future    6. Nasal congestion    Other orders  -     azelastine (ASTELIN) 0.1 % nasal spray; 2 sprays into the nostril(s) as directed by provider 2 (Two) Times a Day. Use in each nostril as directed  Dispense: 30 mL; Refill: 11      * Surgery not found *  Orders Placed This Encounter   Procedures    Polysomnography 4 or More Parameters     Standing Status:   Future     Standing Expiration Date:   12/6/2024     Order Specific Question:   Split Night     Answer:   No     Order Specific Question:   May take own meds     Answer:   Yes     Order Specific Question:   Details     Answer:   O2 Implementation per Protocol     Order Specific Question:   Release to patient     Answer:   Routine Release [[id number]]    Comprehensive Hearing Test     Standing Status:   Future     Standing Expiration Date:   12/6/2024     Order Specific Question:   Laterality     Answer:   Bilateral     Order Specific Question:   Release to patient     Answer:   Routine Release [[id number]]     Return in about 4 weeks (around 1/3/2024) for Recheck.     Continue flonase add astelin   Omeprazole twice daily   Sleep study   Follow up with audio     Patient Instructions   For the best response, use your nasal sprays every day without skipping doses. It may take several weeks before the full effect is acheived.  Gastroesophageal Reflux Disease (Laryngopharyngeal Reflux), Adult  Gastroesophageal reflux disease (GERD) and/or Laryngopharyngeal Reflux, (LPR) happens when acid from your stomach flows up into the esophagus and/or throat and voicebox or larynx. When acid comes in contact with the these organs,  the acid can cause soreness (inflammation). Over time, GERD may create small holes (ulcers) in the lining of the esophagus and may lead to the development of hoarseness, difficulty swallowing,   feeling of something stuck in the throat, increased mucous or drainage and even predispose to the development of malignancies, (cancer).    CAUSES   Increased body weight. This puts pressure on the stomach, making acid rise from the stomach into the esophagus.  Smoking. This increases acid production in the stomach.  Drinking alcohol. This causes decreased pressure in the lower esophageal sphincter (valve or ring of muscle between the esophagus and stomach), allowing acid from the stomach into the esophagus.  Late evening meals and a full stomach. This increases pressure and acid production in the stomach.  A malformed lower esophageal sphincter  Diet which can include avoidance of gluten and dairy products  Age  SYMPTOMS   Burning pain in the lower part of the mid-chest behind the breastbone and in the mid-stomach area. This may occur twice a week or more often.  Trouble swallowing.  Sore throat.  Dry cough.  Asthma-like symptoms including chest tightness, shortness of breath, or wheezing.  Globus sensation-something stuck in the throat/fullness  Hoarseness  DIAGNOSIS   Your caregiver may be able to diagnose GERD based on your symptoms. In some cases, X-rays and other tests may be done to check for complications or to check the condition of your stomach and esophagus.  You may need to see another doctor.  TREATMENT   Over-the-counter or prescription medicines to help decrease acid production.   Dietary and behavioral modifications or changes may be also recommended.  HOME CARE INSTRUCTIONS   Change the factors that you can control. Ask your caregiver for guidance concerning weight loss, quitting smoking, and alcohol consumption.  Avoid foods and drinks that make your symptoms worse, and MAY include such as:  Caffeine or  alcoholic drinks.  Chocolate.  Gluten containing foods  Dairy  Peppermint or mint flavorings.  Garlic and onions.  Spicy foods.  Citrus fruits, such as oranges, apple, or limes.  Tomato-based foods such as sauce, chili, salsa, and pizza.  Fried and fatty foods.  Avoid lying down for the 3 hours prior to your bedtime or prior to taking a nap.  Eat small, frequent meals instead of large meals.  Wear loose-fitting clothing. Do not wear anything tight around your waist that causes pressure on your stomach.  Raise the head of your bed 6 to 8 inches with wood blocks to help you sleep. Extra pillows will not help.  Only take over-the-counter or prescription medicines for pain, discomfort, or fever as directed by your caregiver.  Do not take aspirin, ibuprofen, or other nonsteroidal anti-inflammatory drugs if possible (NSAIDs).  SEEK IMMEDIATE MEDICAL CARE IF:   You have pain in your arms, neck, jaw, teeth, or back.  Your pain increases or changes in intensity or duration.  You develop nausea, vomiting, or sweating (diaphoresis).  You develop shortness of breath, or you faint.  Your vomit is green, yellow, black, or looks like coffee grounds or blood.  Your stool is red, bloody, or black.  These symptoms could be signs of other problems, such as heart disease, gastric bleeding, or esophageal bleeding.  MAKE SURE YOU:   Understand these instructions.  Will watch your condition.  Will get help right away if you are not doing well or get worse.     This information is not intended to replace advice given to you by your physician. Make sure you discuss any questions you have with your health care provider.     Modified by Andrea Ceja MD, FACS 9/8/2016.  Document Released: 09/27/2006 Document Revised: 01/08/2016 Document Reviewed: 04/13/2016  [employer] Interactive Patient Education ©2016 [employer]

## 2024-01-09 DIAGNOSIS — R40.0 DAYTIME SOMNOLENCE: ICD-10-CM

## 2024-01-09 DIAGNOSIS — R06.83 SNORING: Primary | ICD-10-CM

## 2024-01-09 DIAGNOSIS — R53.83 FATIGUE, UNSPECIFIED TYPE: ICD-10-CM

## 2024-01-24 NOTE — PROGRESS NOTES
AUDIOMETRIC EVALUATION      Name:  Sara Foy  :  1975  Age:  48 y.o.  Date of Evaluation:  2024       History:  Reason for visit:  Ms. Foy is seen today at the request of [facility] for a hearing evaluation. Patient was seen by ENT provider on 2023 with complaints of ear pain. She reports difficulty hearing, with the right ear worse than the left. She has not had a hearing test recently.     PE Tubes:  no, both ears   Other otologic surgical history: no, both ears  Tinnitus:  yes, sometimes ringing in both ears   Dizziness:  yes  Noise Exposure: yes, concerts  Aural Fullness:  no, both ears  Otalgia: no, both ears  Family history of hearing loss: yes, parents and paternal grandparents  Other significant history:  skin cancer , autoimmune disorder  Head trauma requiring hospital stay: no  Previous brain MRI: no      EVALUATION:      RESULTS:    Otoscopic Evaluation:  Bilateral: clear canal, tympanic membrane visualized     Tympanometry (226 Hz):  Bilateral: Type A- normal    Pure Tone Audiometry (via inserts with good reliability):    Bilateral: normal sloping to mild sensorineural hearing loss, rising to normal     Speech Audiometry:   Right: Speech Reception Threshold (SRT) was obtained at 30 dBHL  Word Recognition scores-  100% (excellent/within normal limits, %)   Presented at 70dBHL with 50dB of masking in opposite ear  Using NU-6 List 4A, 25 words  Left: Speech Reception Threshold (SRT) was obtained at 25 dBHL  Word Recognition scores-  100% (excellent/within normal limits, %)   Presented at 65dBHL with 45dB of masking in opposite ear  Using NU-6 List 4A, 25 words    IMPRESSIONS:  Tympanometry showed normal middle ear pressure and static compliance, for both ears. Pure tone thresholds for both ears show a normal sloping to mild sensorineural hearing loss, rising to normal. Results suggest normal outer/middle ear function and abnormal cochlear/retrocochlear  function, bilaterally. Right ear is slightly worse than the left in the lower frequencies. Patient was counseled with regard to the findings.    Amplification needs:  Patient could benefit from amplification if they feel increased communication difficulties.    Diagnosis:  1. Sensorineural hearing loss (SNHL) of both ears    2. Tinnitus of both ears    3. Dizziness         RECOMMENDATIONS/PLAN:  Follow-up recommendations per [facility]    Audiologic follow-up in 1 year  Return for audiologic testing if you begin to changes in hearing or concerns arise  Use communication strategies such as looking at the speaker when they talk, have them get your attention before they speak, have them rephrase instead of repeat if you cannot understand them, limit background noise and be in the same room as the speaker  Use hearing protection around loud noises such as lawn equipment, power tools, and firearms  Avoid silence when possible. Sleep with white noise/fan, or listen to nature sounds     EDUCATION:  Discussed results and recommendations with patient. Questions were addressed and the patient was encouraged to contact our department should concerns arise.        Ravinder Santacruz, CCC-A, F-AAA  Licensed Audiologist

## 2024-01-25 ENCOUNTER — TELEPHONE (OUTPATIENT)
Dept: OTOLARYNGOLOGY | Facility: CLINIC | Age: 49
End: 2024-01-25

## 2024-01-25 ENCOUNTER — PROCEDURE VISIT (OUTPATIENT)
Dept: OTOLARYNGOLOGY | Facility: CLINIC | Age: 49
End: 2024-01-25
Payer: MEDICAID

## 2024-01-25 ENCOUNTER — OFFICE VISIT (OUTPATIENT)
Dept: OTOLARYNGOLOGY | Facility: CLINIC | Age: 49
End: 2024-01-25
Payer: MEDICAID

## 2024-01-25 VITALS — BODY MASS INDEX: 39.72 KG/M2 | WEIGHT: 246 LBS

## 2024-01-25 DIAGNOSIS — H92.03 OTALGIA OF BOTH EARS: ICD-10-CM

## 2024-01-25 DIAGNOSIS — H93.13 TINNITUS OF BOTH EARS: ICD-10-CM

## 2024-01-25 DIAGNOSIS — R40.0 DAYTIME SOMNOLENCE: ICD-10-CM

## 2024-01-25 DIAGNOSIS — R42 DIZZINESS: ICD-10-CM

## 2024-01-25 DIAGNOSIS — R06.83 SNORING: Primary | ICD-10-CM

## 2024-01-25 DIAGNOSIS — H90.3 SENSORINEURAL HEARING LOSS (SNHL) OF BOTH EARS: Primary | ICD-10-CM

## 2024-01-25 DIAGNOSIS — R53.83 FATIGUE, UNSPECIFIED TYPE: ICD-10-CM

## 2024-01-25 NOTE — TELEPHONE ENCOUNTER
The [facility] received a fax that requires your attention. The document has been indexed to the patient's chart for your review.    Reason for sending: DIAGNOSTIC HOME SLEEP STUDY RESULTS    Documents Description: HOME SLEEP STUDY_[facility]_01/18/24    Name of Sender: JACEK LANG [facility]    Date Indexed: 01/25/24

## 2024-01-25 NOTE — PATIENT INSTRUCTIONS
Gastroesophageal Reflux Disease (Laryngopharyngeal Reflux), Adult  Gastroesophageal reflux disease (GERD) and/or Laryngopharyngeal Reflux, (LPR) happens when acid from your stomach flows up into the esophagus and/or throat and voicebox or larynx. When acid comes in contact with the these organs, the acid can cause soreness (inflammation). Over time, GERD may create small holes (ulcers) in the lining of the esophagus and may lead to the development of hoarseness, difficulty swallowing,   feeling of something stuck in the throat, increased mucous or drainage and even predispose to the development of malignancies, (cancer).    CAUSES   Increased body weight. This puts pressure on the stomach, making acid rise from the stomach into the esophagus.  Smoking. This increases acid production in the stomach.  Drinking alcohol. This causes decreased pressure in the lower esophageal sphincter (valve or ring of muscle between the esophagus and stomach), allowing acid from the stomach into the esophagus.  Late evening meals and a full stomach. This increases pressure and acid production in the stomach.  A malformed lower esophageal sphincter  Diet which can include avoidance of gluten and dairy products  Age  SYMPTOMS   Burning pain in the lower part of the mid-chest behind the breastbone and in the mid-stomach area. This may occur twice a week or more often.  Trouble swallowing.  Sore throat.  Dry cough.  Asthma-like symptoms including chest tightness, shortness of breath, or wheezing.  Globus sensation-something stuck in the throat/fullness  Hoarseness  DIAGNOSIS   Your caregiver may be able to diagnose GERD based on your symptoms. In some cases, X-rays and other tests may be done to check for complications or to check the condition of your stomach and esophagus.  You may need to see another doctor.  TREATMENT   Over-the-counter or prescription medicines to help decrease acid production.   Dietary and behavioral modifications  or changes may be also recommended.  HOME CARE INSTRUCTIONS   Change the factors that you can control. Ask your caregiver for guidance concerning weight loss, quitting smoking, and alcohol consumption.  Avoid foods and drinks that make your symptoms worse, and MAY include such as:  Caffeine or alcoholic drinks.  Chocolate.  Gluten containing foods  Dairy  Peppermint or mint flavorings.  Garlic and onions.  Spicy foods.  Citrus fruits, such as oranges, apple, or limes.  Tomato-based foods such as sauce, chili, salsa, and pizza.  Fried and fatty foods.  Avoid lying down for the 3 hours prior to your bedtime or prior to taking a nap.  Eat small, frequent meals instead of large meals.  Wear loose-fitting clothing. Do not wear anything tight around your waist that causes pressure on your stomach.  Raise the head of your bed 6 to 8 inches with wood blocks to help you sleep. Extra pillows will not help.  Only take over-the-counter or prescription medicines for pain, discomfort, or fever as directed by your caregiver.  Do not take aspirin, ibuprofen, or other nonsteroidal anti-inflammatory drugs if possible (NSAIDs).  SEEK IMMEDIATE MEDICAL CARE IF:   You have pain in your arms, neck, jaw, teeth, or back.  Your pain increases or changes in intensity or duration.  You develop nausea, vomiting, or sweating (diaphoresis).  You develop shortness of breath, or you faint.  Your vomit is green, yellow, black, or looks like coffee grounds or blood.  Your stool is red, bloody, or black.  These symptoms could be signs of other problems, such as heart disease, gastric bleeding, or esophageal bleeding.  MAKE SURE YOU:   Understand these instructions.  Will watch your condition.  Will get help right away if you are not doing well or get worse.     This information is not intended to replace advice given to you by your physician. Make sure you discuss any questions you have with your health care provider.     Modified by Andrea Ceja,  MD, FACS 9/8/2016.  Document Released: 09/27/2006 Document Revised: 01/08/2016 Document Reviewed: 04/13/2016  [employer] Interactive Patient Education ©2016 [employer]

## 2024-01-25 NOTE — PROGRESS NOTES
YOB: 1975  Location: [facility]  Location Address: [address] [place] [address], [facility], [address] [place] [address]  Location Phone: [phone]    Chief Complaint   Patient presents with    Sleep Apnea    Ear Problem       History of Present Illness  Sara Foy is a 48 y.o. female.  Sara Foy is here for follow up of ENT complaints. The patient has had problems with ear pain, fatigue, and reflux   Patient has been taking omeprazole twice daily as directed and does feel as if symptoms have improved significantly.   She is using flonase and astelin daily and states ears have improved as well.   Patient had sleep study performed which revealed sleep apnea, she has not started cpap at this time and is waiting on appointment with sleep medicine.     EPWORTH: 12  AHI 8.4       Procedure visit with Maria Luz Benitez AUD (2024)   Past Medical History:   Diagnosis Date    Asthma     Chronic pain     DVT (deep venous thrombosis)     GERD (gastroesophageal reflux disease)     High blood pressure     Low back pain     Peptic ulcer     Scoliosis     Skin lesion        Past Surgical History:   Procedure Laterality Date    FLAP HEAD/NECK N/A 3/10/2021    Procedure: possible flap or graft;  Surgeon: Andrea Ceja MD;  Location: [facility] OR;  Service: ENT;  Laterality: N/A;    HEAD/NECK LESION/CYST EXCISION N/A 3/10/2021    Procedure: 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      azelastine (ASTELIN) 0.1 % nasal spray 2 sprays into the nostril(s) as directed by provider 2 (Two) Times  a Day. Use in each nostril as directed 30 mL 11    Diclofenac Sodium 3 % gel gel       ergocalciferol (ERGOCALCIFEROL) 1.25 MG (78271 UT) capsule Take 1 capsule by mouth 1 (One) Time Per Week.      fluticasone (FLONASE) 50 MCG/ACT nasal spray 2 sprays into the nostril(s) as directed by provider Daily As Needed.      multivitamin (THERAGRAN) tablet tablet Take 1 tablet by mouth Daily.      omeprazole (priLOSEC) 40 MG capsule Take 1 capsule by mouth Daily As Needed.      Sucralfate (CARAFATE PO) Take 1 g by mouth Daily As Needed.      VITAMIN D, CHOLECALCIFEROL, PO Take 1 tablet by mouth Daily.         Penicillins, Duloxetine hcl, Doxycycline, Iodinated contrast media, Levaquin [levofloxacin], and Contrast dye (echo or unknown ct/mr)    Family History   Problem Relation Age of Onset    No Known Problems Mother     No Known Problems Father        Social History     Socioeconomic History    Marital status:    Tobacco Use    Smoking status: Never    Smokeless tobacco: Never   Vaping Use    Vaping Use: Never used   Substance and Sexual Activity    Alcohol use: Yes     Comment: socially    Drug use: Not Currently     Comment: no recent substance abuse, only in her teens    Sexual activity: Defer       Review of Systems   Constitutional:  Positive for fatigue.   HENT:          Admits snoring      Psychiatric/Behavioral:  Positive for sleep disturbance.        There were no vitals filed for this visit.    Body mass index is 39.72 kg/m².    Objective     Physical Exam  Vitals reviewed.   Constitutional:       Appearance: She is obese.   HENT:      Head: Normocephalic.      Right Ear: Tympanic membrane, ear canal and external ear normal.      Left Ear: Tympanic membrane, ear canal and external ear normal.      Nose: Nose normal.      Mouth/Throat:      Lips: Pink.      Mouth: Mucous membranes are moist.      Comments: Amato III     Neurological:      Mental Status: She is alert.         Assessment & Plan   Diagnoses  and all orders for this visit:    1. Snoring (Primary)  -     Ambulatory Referral to Sleep Medicine    2. Fatigue, unspecified type  -     Ambulatory Referral to Sleep Medicine    3. Daytime somnolence  -     Ambulatory Referral to Sleep Medicine    4. Otalgia of both ears      * Surgery not found *  Orders Placed This Encounter   Procedures    Ambulatory Referral to Sleep Medicine     Referral Priority:   Routine     Referral Type:   Consultation     Requested Specialty:   Sleep Medicine     Number of Visits Requested:   1     Return in about 3 months (around 4/25/2024) for Recheck.     Start cpap   Referral to sleep medicine   Continue nasal sprays and omeprazole   Discussed decreasing omeprazole dosing as symptoms improved     Patient Instructions   Gastroesophageal Reflux Disease (Laryngopharyngeal Reflux), Adult  Gastroesophageal reflux disease (GERD) and/or Laryngopharyngeal Reflux, (LPR) happens when acid from your stomach flows up into the esophagus and/or throat and voicebox or larynx. When acid comes in contact with the these organs, the acid can cause soreness (inflammation). Over time, GERD may create small holes (ulcers) in the lining of the esophagus and may lead to the development of hoarseness, difficulty swallowing,   feeling of something stuck in the throat, increased mucous or drainage and even predispose to the development of malignancies, (cancer).    CAUSES   Increased body weight. This puts pressure on the stomach, making acid rise from the stomach into the esophagus.  Smoking. This increases acid production in the stomach.  Drinking alcohol. This causes decreased pressure in the lower esophageal sphincter (valve or ring of muscle between the esophagus and stomach), allowing acid from the stomach into the esophagus.  Late evening meals and a full stomach. This increases pressure and acid production in the stomach.  A malformed lower esophageal sphincter  Diet which can include avoidance of  gluten and dairy products  Age  SYMPTOMS   Burning pain in the lower part of the mid-chest behind the breastbone and in the mid-stomach area. This may occur twice a week or more often.  Trouble swallowing.  Sore throat.  Dry cough.  Asthma-like symptoms including chest tightness, shortness of breath, or wheezing.  Globus sensation-something stuck in the throat/fullness  Hoarseness  DIAGNOSIS   Your caregiver may be able to diagnose GERD based on your symptoms. In some cases, X-rays and other tests may be done to check for complications or to check the condition of your stomach and esophagus.  You may need to see another doctor.  TREATMENT   Over-the-counter or prescription medicines to help decrease acid production.   Dietary and behavioral modifications or changes may be also recommended.  HOME CARE INSTRUCTIONS   Change the factors that you can control. Ask your caregiver for guidance concerning weight loss, quitting smoking, and alcohol consumption.  Avoid foods and drinks that make your symptoms worse, and MAY include such as:  Caffeine or alcoholic drinks.  Chocolate.  Gluten containing foods  Dairy  Peppermint or mint flavorings.  Garlic and onions.  Spicy foods.  Citrus fruits, such as oranges, apple, or limes.  Tomato-based foods such as sauce, chili, salsa, and pizza.  Fried and fatty foods.  Avoid lying down for the 3 hours prior to your bedtime or prior to taking a nap.  Eat small, frequent meals instead of large meals.  Wear loose-fitting clothing. Do not wear anything tight around your waist that causes pressure on your stomach.  Raise the head of your bed 6 to 8 inches with wood blocks to help you sleep. Extra pillows will not help.  Only take over-the-counter or prescription medicines for pain, discomfort, or fever as directed by your caregiver.  Do not take aspirin, ibuprofen, or other nonsteroidal anti-inflammatory drugs if possible (NSAIDs).  SEEK IMMEDIATE MEDICAL CARE IF:   You have pain in your  arms, neck, jaw, teeth, or back.  Your pain increases or changes in intensity or duration.  You develop nausea, vomiting, or sweating (diaphoresis).  You develop shortness of breath, or you faint.  Your vomit is green, yellow, black, or looks like coffee grounds or blood.  Your stool is red, bloody, or black.  These symptoms could be signs of other problems, such as heart disease, gastric bleeding, or esophageal bleeding.  MAKE SURE YOU:   Understand these instructions.  Will watch your condition.  Will get help right away if you are not doing well or get worse.     This information is not intended to replace advice given to you by your physician. Make sure you discuss any questions you have with your health care provider.     Modified by Andrea Ceja MD, FACS 9/8/2016.  Document Released: 09/27/2006 Document Revised: 01/08/2016 Document Reviewed: 04/13/2016  [employer] Patient Education ©2016 [employer]

## 2024-02-14 ENCOUNTER — PATIENT MESSAGE (OUTPATIENT)
Dept: OBGYN CLINIC | Age: 49
End: 2024-02-14

## 2024-03-14 ENCOUNTER — OFFICE VISIT (OUTPATIENT)
Dept: OBGYN CLINIC | Age: 49
End: 2024-03-14
Payer: MEDICAID

## 2024-03-14 VITALS
HEART RATE: 53 BPM | SYSTOLIC BLOOD PRESSURE: 114 MMHG | HEIGHT: 67 IN | DIASTOLIC BLOOD PRESSURE: 75 MMHG | WEIGHT: 243 LBS | BODY MASS INDEX: 38.14 KG/M2

## 2024-03-14 DIAGNOSIS — B37.9 YEAST INFECTION: ICD-10-CM

## 2024-03-14 DIAGNOSIS — R29.890 HEIGHT LOSS: ICD-10-CM

## 2024-03-14 DIAGNOSIS — L68.0 HIRSUTISM: Primary | ICD-10-CM

## 2024-03-14 DIAGNOSIS — L68.0 HIRSUTISM: ICD-10-CM

## 2024-03-14 DIAGNOSIS — R10.2 PELVIC PAIN: ICD-10-CM

## 2024-03-14 DIAGNOSIS — N95.1 VASOMOTOR SYMPTOMS DUE TO MENOPAUSE: ICD-10-CM

## 2024-03-14 DIAGNOSIS — L65.8 FEMALE PATTERN HAIR LOSS: ICD-10-CM

## 2024-03-14 DIAGNOSIS — R10.33 PERIUMBILICAL PAIN: ICD-10-CM

## 2024-03-14 DIAGNOSIS — Z12.31 SCREENING MAMMOGRAM FOR BREAST CANCER: ICD-10-CM

## 2024-03-14 LAB
ESTRADIOL SERPL-SCNC: 18.3 PG/ML
HBA1C MFR BLD: 5.2 % (ref 4–6)
PROGEST SERPL-MCNC: <0.05 NG/ML
T4 FREE SERPL-MCNC: 1.18 NG/DL (ref 0.93–1.7)
TSH SERPL DL<=0.005 MIU/L-ACNC: 1.36 UIU/ML (ref 0.27–4.2)

## 2024-03-14 PROCEDURE — 99203 OFFICE O/P NEW LOW 30 MIN: CPT

## 2024-03-14 RX ORDER — CETIRIZINE HYDROCHLORIDE 10 MG/1
TABLET ORAL
COMMUNITY

## 2024-03-14 RX ORDER — SUCRALFATE 1 G/1
1 TABLET ORAL DAILY PRN
COMMUNITY

## 2024-03-14 RX ORDER — FLUCONAZOLE 150 MG/1
150 TABLET ORAL
Qty: 2 TABLET | Refills: 0 | Status: SHIPPED | OUTPATIENT
Start: 2024-03-14 | End: 2024-03-20

## 2024-03-14 RX ORDER — ALBUTEROL SULFATE 90 UG/1
2 AEROSOL, METERED RESPIRATORY (INHALATION)
COMMUNITY

## 2024-03-14 RX ORDER — GUAIFENESIN 1200 MG/1
TABLET, EXTENDED RELEASE ORAL
COMMUNITY

## 2024-03-14 RX ORDER — SERTRALINE HYDROCHLORIDE 20 MG/ML
25 SOLUTION ORAL DAILY
COMMUNITY

## 2024-03-14 RX ORDER — HYOSCYAMINE SULFATE 0.125 MG
TABLET,DISINTEGRATING ORAL
COMMUNITY
Start: 2024-02-13

## 2024-03-14 RX ORDER — METHOCARBAMOL 500 MG/1
TABLET, FILM COATED ORAL
COMMUNITY

## 2024-03-14 RX ORDER — AZELASTINE 1 MG/ML
2 SPRAY, METERED NASAL 2 TIMES DAILY
COMMUNITY
Start: 2023-12-06

## 2024-03-14 RX ORDER — DICLOFENAC SODIUM 30 MG/G
GEL TOPICAL
COMMUNITY
Start: 2022-05-24

## 2024-03-14 RX ORDER — BUSPIRONE HYDROCHLORIDE 5 MG/1
5 TABLET ORAL 3 TIMES DAILY
COMMUNITY

## 2024-03-14 RX ORDER — TIZANIDINE 4 MG/1
TABLET ORAL EVERY 8 HOURS
COMMUNITY

## 2024-03-14 ASSESSMENT — ENCOUNTER SYMPTOMS
DIARRHEA: 0
CHEST TIGHTNESS: 0
BACK PAIN: 0
ABDOMINAL PAIN: 0
RECTAL PAIN: 0
CONSTIPATION: 0
SHORTNESS OF BREATH: 0
RESPIRATORY NEGATIVE: 1
NAUSEA: 0
GASTROINTESTINAL NEGATIVE: 1

## 2024-03-14 NOTE — PROGRESS NOTES
PT is here for PCOS issues, ovary pain and hair loss. Excessive hair growth. This has been going on for about 4 or 5 years. She would like her hormones looked at. Severe fatigue. She has fibromyalgia. She has a cyst on her ovary, cyst on her kidney, and growth on gallbadder. They are going to remove. She meets with the dr on 4th of April. In [place].     Lost 3 in of height  
hours as needed for Nausea or Vomiting      VITAMIN D, CHOLECALCIFEROL, PO Take by mouth Indications: not picked up new script      B Complex Vitamins (VITAMIN B COMPLEX PO) Take by mouth (Patient not taking: Reported on 3/14/2024)       No current facility-administered medications for this visit.     Allergies   Allergen Reactions    Cymbalta [Duloxetine Hcl] Other (See Comments)     suicidal    Iv Contrast [Iodides] Hives    Levaquin [Levofloxacin]     Meloxicam      \"Ate a hole in her stomach\"    Pcn [Penicillins] Hives and Swelling     Vitals:    03/14/24 1559   BP: 114/75   Site: Right Upper Arm   Position: Sitting   Cuff Size: Large Adult   Pulse: 53   Weight: 110.2 kg (243 lb)   Height: 1.702 m (5' 7\")     Body mass index is 38.06 kg/m².    Review of Systems   Constitutional: Negative.  Negative for activity change, fever and unexpected weight change.   Respiratory: Negative.  Negative for chest tightness and shortness of breath.    Cardiovascular: Negative.  Negative for chest pain.   Gastrointestinal: Negative.  Negative for abdominal pain, constipation, diarrhea, nausea and rectal pain.   Genitourinary:  Positive for pelvic pain and vaginal discharge (yeast infection). Negative for difficulty urinating, dyspareunia, frequency, genital sores, menstrual problem and vaginal pain.   Musculoskeletal: Negative.  Negative for back pain and gait problem.   Skin: Negative.    Neurological: Negative.  Negative for dizziness and headaches.   Psychiatric/Behavioral: Negative.  Negative for behavioral problems, self-injury and suicidal ideas. The patient is not nervous/anxious.        Physical Exam  Vitals and nursing note reviewed.   Constitutional:       Appearance: Normal appearance. She is well-developed. She is not diaphoretic.   HENT:      Head: Normocephalic and atraumatic.      Nose: Nose normal.   Eyes:      Extraocular Movements: Extraocular movements intact.      Conjunctiva/sclera: Conjunctivae normal.

## 2024-03-16 LAB
DHEA-S SERPL-MCNC: 27 UG/DL (ref 35–256)
ESTRIOL SERPL-MCNC: <0.2 NG/ML

## 2024-03-17 LAB — ESTRONE SERPL-MCNC: 20.1 PG/ML

## 2024-03-18 ENCOUNTER — TRANSCRIBE ORDERS (OUTPATIENT)
Dept: GENERAL RADIOLOGY | Facility: HOSPITAL | Age: 49
End: 2024-03-18
Payer: MEDICAID

## 2024-03-18 ENCOUNTER — HOSPITAL ENCOUNTER (OUTPATIENT)
Dept: GENERAL RADIOLOGY | Facility: HOSPITAL | Age: 49
Discharge: HOME | End: 2024-03-18
Admitting: NURSE PRACTITIONER
Payer: MEDICAID

## 2024-03-18 ENCOUNTER — HOSPITAL ENCOUNTER (OUTPATIENT)
Dept: ULTRASOUND IMAGING | Age: 49
Discharge: HOME | End: 2024-03-18
Payer: MEDICAID

## 2024-03-18 DIAGNOSIS — M54.14 THORACIC RADICULITIS: ICD-10-CM

## 2024-03-18 DIAGNOSIS — R10.2 PELVIC PAIN: ICD-10-CM

## 2024-03-18 DIAGNOSIS — M47.816 LUMBAR SPONDYLOSIS: ICD-10-CM

## 2024-03-18 DIAGNOSIS — M79.7 FIBROSITIS: ICD-10-CM

## 2024-03-18 DIAGNOSIS — G89.29 OTHER CHRONIC PAIN: ICD-10-CM

## 2024-03-18 DIAGNOSIS — M41.20 OTHER IDIOPATHIC SCOLIOSIS, UNSPECIFIED SPINAL REGION: Primary | ICD-10-CM

## 2024-03-18 DIAGNOSIS — M41.20 OTHER IDIOPATHIC SCOLIOSIS, UNSPECIFIED SPINAL REGION: ICD-10-CM

## 2024-03-18 PROCEDURE — 72082 X-RAY EXAM ENTIRE SPI 2/3 VW: CPT

## 2024-03-18 PROCEDURE — 76830 TRANSVAGINAL US NON-OB: CPT

## 2024-03-19 LAB
SHBG SERPL-SCNC: 84 NMOL/L (ref 25–122)
TESTOST FREE SERPL-MCNC: 1.8 PG/ML (ref 1.1–5.8)
TESTOST SERPL-MCNC: 20 NG/DL (ref 9–55)

## 2024-03-22 ENCOUNTER — OFFICE VISIT (OUTPATIENT)
Dept: OBGYN CLINIC | Age: 49
End: 2024-03-22
Payer: MEDICAID

## 2024-03-22 VITALS
WEIGHT: 242 LBS | SYSTOLIC BLOOD PRESSURE: 106 MMHG | DIASTOLIC BLOOD PRESSURE: 78 MMHG | HEART RATE: 51 BPM | BODY MASS INDEX: 41.32 KG/M2 | HEIGHT: 64 IN

## 2024-03-22 DIAGNOSIS — R23.2 HOT FLASHES: Primary | ICD-10-CM

## 2024-03-22 DIAGNOSIS — L68.0 HIRSUTISM: ICD-10-CM

## 2024-03-22 PROCEDURE — 99214 OFFICE O/P EST MOD 30 MIN: CPT

## 2024-03-22 RX ORDER — SPIRONOLACTONE 50 MG/1
50 TABLET, FILM COATED ORAL DAILY
Qty: 30 TABLET | Refills: 5 | Status: SHIPPED | OUTPATIENT
Start: 2024-03-22

## 2024-03-22 RX ORDER — PROGESTERONE 100 MG/1
100 CAPSULE ORAL NIGHTLY
Qty: 30 CAPSULE | Refills: 2 | Status: SHIPPED | OUTPATIENT
Start: 2024-03-22

## 2024-03-28 ASSESSMENT — ENCOUNTER SYMPTOMS
RECTAL PAIN: 0
SHORTNESS OF BREATH: 0
CHEST TIGHTNESS: 0
CONSTIPATION: 0
ABDOMINAL PAIN: 0
BACK PAIN: 0
RESPIRATORY NEGATIVE: 1
GASTROINTESTINAL NEGATIVE: 1
DIARRHEA: 0
NAUSEA: 0

## 2024-07-15 ENCOUNTER — HOSPITAL ENCOUNTER (OUTPATIENT)
Dept: WOMENS IMAGING | Age: 49
Discharge: HOME | End: 2024-07-15
Payer: MEDICAID

## 2024-07-15 VITALS — BODY MASS INDEX: 41.37 KG/M2 | WEIGHT: 241 LBS

## 2024-07-15 DIAGNOSIS — Z12.31 SCREENING MAMMOGRAM FOR BREAST CANCER: ICD-10-CM

## 2024-07-15 PROCEDURE — 77063 BREAST TOMOSYNTHESIS BI: CPT

## 2024-07-20 ASSESSMENT — PATIENT HEALTH QUESTIONNAIRE - PHQ9
8. MOVING OR SPEAKING SO SLOWLY THAT OTHER PEOPLE COULD HAVE NOTICED. OR THE OPPOSITE - BEING SO FIDGETY OR RESTLESS THAT YOU HAVE BEEN MOVING AROUND A LOT MORE THAN USUAL: NOT AT ALL
1. LITTLE INTEREST OR PLEASURE IN DOING THINGS: MORE THAN HALF THE DAYS
2. FEELING DOWN, DEPRESSED OR HOPELESS: MORE THAN HALF THE DAYS
7. TROUBLE CONCENTRATING ON THINGS, SUCH AS READING THE NEWSPAPER OR WATCHING TELEVISION: NEARLY EVERY DAY
SUM OF ALL RESPONSES TO PHQ9 QUESTIONS 1 & 2: 4
6. FEELING BAD ABOUT YOURSELF - OR THAT YOU ARE A FAILURE OR HAVE LET YOURSELF OR YOUR FAMILY DOWN: NEARLY EVERY DAY
10. IF YOU CHECKED OFF ANY PROBLEMS, HOW DIFFICULT HAVE THESE PROBLEMS MADE IT FOR YOU TO DO YOUR WORK, TAKE CARE OF THINGS AT HOME, OR GET ALONG WITH OTHER PEOPLE: VERY DIFFICULT
SUM OF ALL RESPONSES TO PHQ QUESTIONS 1-9: 18
3. TROUBLE FALLING OR STAYING ASLEEP: NEARLY EVERY DAY
4. FEELING TIRED OR HAVING LITTLE ENERGY: NEARLY EVERY DAY
9. THOUGHTS THAT YOU WOULD BE BETTER OFF DEAD, OR OF HURTING YOURSELF: NOT AT ALL
5. POOR APPETITE OR OVEREATING: MORE THAN HALF THE DAYS

## 2024-07-23 ENCOUNTER — OFFICE VISIT (OUTPATIENT)
Dept: OBGYN CLINIC | Age: 49
End: 2024-07-23
Payer: MEDICAID

## 2024-07-23 VITALS
WEIGHT: 239 LBS | BODY MASS INDEX: 40.8 KG/M2 | DIASTOLIC BLOOD PRESSURE: 95 MMHG | HEIGHT: 64 IN | SYSTOLIC BLOOD PRESSURE: 136 MMHG | HEART RATE: 62 BPM

## 2024-07-23 DIAGNOSIS — Z13.31 STANDARDIZED ADULT DEPRESSION SCREENING TOOL COMPLETED: ICD-10-CM

## 2024-07-23 DIAGNOSIS — Z13.31 POSITIVE DEPRESSION SCREENING: ICD-10-CM

## 2024-07-23 DIAGNOSIS — L68.0 HIRSUTISM: ICD-10-CM

## 2024-07-23 DIAGNOSIS — L65.8 FEMALE PATTERN HAIR LOSS: ICD-10-CM

## 2024-07-23 DIAGNOSIS — R23.2 HOT FLASHES: Primary | ICD-10-CM

## 2024-07-23 PROCEDURE — 96127 BRIEF EMOTIONAL/BEHAV ASSMT: CPT

## 2024-07-23 PROCEDURE — 99214 OFFICE O/P EST MOD 30 MIN: CPT

## 2024-07-23 ASSESSMENT — ENCOUNTER SYMPTOMS
BACK PAIN: 0
GASTROINTESTINAL NEGATIVE: 1
SHORTNESS OF BREATH: 0
DIARRHEA: 0
CHEST TIGHTNESS: 0
RESPIRATORY NEGATIVE: 1
NAUSEA: 0
CONSTIPATION: 0
ABDOMINAL PAIN: 0
RECTAL PAIN: 0
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
## 2024-07-24 ENCOUNTER — PATIENT MESSAGE (OUTPATIENT)
Dept: OBGYN CLINIC | Age: 49
End: 2024-07-24

## 2024-07-26 DIAGNOSIS — F41.9 ANXIETY AND DEPRESSION: ICD-10-CM

## 2024-07-26 DIAGNOSIS — F32.A ANXIETY AND DEPRESSION: ICD-10-CM

## 2024-07-26 DIAGNOSIS — F41.9 ANXIETY AND DEPRESSION: Primary | ICD-10-CM

## 2024-07-26 DIAGNOSIS — F32.A ANXIETY AND DEPRESSION: Primary | ICD-10-CM

## 2024-07-26 RX ORDER — LORAZEPAM 1 MG/1
1 TABLET ORAL NIGHTLY PRN
Qty: 30 TABLET | Refills: 0 | Status: SHIPPED | OUTPATIENT
Start: 2024-07-26 | End: 2024-07-26 | Stop reason: SDUPTHER

## 2024-07-26 RX ORDER — LORAZEPAM 1 MG/1
1 TABLET ORAL NIGHTLY PRN
Qty: 30 TABLET | Refills: 0 | Status: SHIPPED | OUTPATIENT
Start: 2024-07-26 | End: 2024-08-25

## 2024-07-31 ENCOUNTER — TELEMEDICINE (OUTPATIENT)
Dept: OBGYN CLINIC | Age: 49
End: 2024-07-31
Payer: MEDICAID

## 2024-07-31 DIAGNOSIS — Z13.31 POSITIVE DEPRESSION SCREENING: Primary | ICD-10-CM

## 2024-07-31 DIAGNOSIS — F32.9 REACTIVE DEPRESSION: ICD-10-CM

## 2024-07-31 PROCEDURE — 99214 OFFICE O/P EST MOD 30 MIN: CPT

## 2024-07-31 RX ORDER — BUPROPION HYDROCHLORIDE 150 MG/1
150 TABLET ORAL EVERY MORNING
Qty: 30 TABLET | Refills: 3 | Status: SHIPPED | OUTPATIENT
Start: 2024-07-31

## 2024-07-31 ASSESSMENT — ENCOUNTER SYMPTOMS
RECTAL PAIN: 0
ABDOMINAL PAIN: 0
GASTROINTESTINAL NEGATIVE: 1
NAUSEA: 0
RESPIRATORY NEGATIVE: 1
CHEST TIGHTNESS: 0
SHORTNESS OF BREATH: 0
CONSTIPATION: 0
DIARRHEA: 0
BACK PAIN: 0
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
## 2024-09-13 ENCOUNTER — TELEMEDICINE (OUTPATIENT)
Dept: OBGYN CLINIC | Age: 49
End: 2024-09-13

## 2024-09-13 DIAGNOSIS — B37.9 YEAST INFECTION: ICD-10-CM

## 2024-09-13 DIAGNOSIS — F41.1 GENERALIZED ANXIETY DISORDER: ICD-10-CM

## 2024-09-13 DIAGNOSIS — Z13.31 POSITIVE DEPRESSION SCREENING: ICD-10-CM

## 2024-09-13 DIAGNOSIS — Z72.820 POOR SLEEP: ICD-10-CM

## 2024-09-13 DIAGNOSIS — Z71.2 ENCOUNTER TO DISCUSS TEST RESULTS: Primary | ICD-10-CM

## 2024-09-13 RX ORDER — CELECOXIB 50 MG/1
50 CAPSULE ORAL 2 TIMES DAILY
COMMUNITY

## 2024-09-13 RX ORDER — FLUCONAZOLE 150 MG/1
150 TABLET ORAL
Qty: 2 TABLET | Refills: 0 | Status: CANCELLED | OUTPATIENT
Start: 2024-09-13 | End: 2024-09-19

## 2024-09-14 RX ORDER — LORAZEPAM 1 MG/1
TABLET ORAL
Qty: 30 TABLET | Refills: 0 | Status: SHIPPED | OUTPATIENT
Start: 2024-09-14 | End: 2024-10-14

## 2024-09-14 RX ORDER — FLUCONAZOLE 150 MG/1
150 TABLET ORAL
Qty: 3 TABLET | Refills: 0 | Status: SHIPPED | OUTPATIENT
Start: 2024-09-14 | End: 2024-09-21

## 2024-09-14 ASSESSMENT — ENCOUNTER SYMPTOMS
CONSTIPATION: 0
DIARRHEA: 0
RECTAL PAIN: 0
GASTROINTESTINAL NEGATIVE: 1
SHORTNESS OF BREATH: 0
RESPIRATORY NEGATIVE: 1
BACK PAIN: 1
ABDOMINAL PAIN: 0
CHEST TIGHTNESS: 0
NAUSEA: 0

## 2024-11-04 ENCOUNTER — OFFICE VISIT (OUTPATIENT)
Dept: OTOLARYNGOLOGY | Facility: CLINIC | Age: 49
End: 2024-11-04
Payer: MEDICAID

## 2024-11-04 VITALS
BODY MASS INDEX: 37.45 KG/M2 | HEART RATE: 99 BPM | WEIGHT: 233 LBS | SYSTOLIC BLOOD PRESSURE: 123 MMHG | HEIGHT: 66 IN | DIASTOLIC BLOOD PRESSURE: 91 MMHG | TEMPERATURE: 98.4 F

## 2024-11-04 DIAGNOSIS — H90.3 SENSORINEURAL HEARING LOSS (SNHL) OF BOTH EARS: Primary | ICD-10-CM

## 2024-11-04 DIAGNOSIS — R42 DIZZINESS: ICD-10-CM

## 2024-11-04 DIAGNOSIS — H93.13 TINNITUS OF BOTH EARS: ICD-10-CM

## 2024-11-04 DIAGNOSIS — D48.9 NEOPLASM OF UNCERTAIN BEHAVIOR: ICD-10-CM

## 2024-11-04 DIAGNOSIS — R40.0 DAYTIME SOMNOLENCE: ICD-10-CM

## 2024-11-04 DIAGNOSIS — R06.83 SNORING: ICD-10-CM

## 2024-11-04 DIAGNOSIS — K21.9 LARYNGOPHARYNGEAL REFLUX: ICD-10-CM

## 2024-11-04 DIAGNOSIS — L98.9 SKIN LESION: ICD-10-CM

## 2024-11-04 RX ORDER — LORAZEPAM 1 MG/1
1 TABLET ORAL NIGHTLY PRN
COMMUNITY
Start: 2024-09-14

## 2024-11-04 RX ORDER — CYCLOBENZAPRINE HCL 10 MG
TABLET ORAL AS NEEDED
COMMUNITY

## 2024-11-04 RX ORDER — IBUPROFEN 200 MG
TABLET ORAL
COMMUNITY

## 2024-11-04 RX ORDER — BUPROPION HYDROCHLORIDE 150 MG/1
1 TABLET ORAL EVERY MORNING
COMMUNITY
Start: 2024-07-31

## 2024-11-04 RX ORDER — METHOCARBAMOL 500 MG/1
TABLET, FILM COATED ORAL
COMMUNITY

## 2024-11-04 RX ORDER — ASPIRIN 81 MG/1
TABLET ORAL
COMMUNITY

## 2024-11-04 RX ORDER — CELECOXIB 50 MG/1
50 CAPSULE ORAL
COMMUNITY

## 2024-11-04 NOTE — PROGRESS NOTES
YOB: 1975  Location: [facility]  Location Address: [address] [place] [address], [facility], [address] [place] [address]  Location Phone: [phone]    Chief Complaint   Patient presents with    Sleep Apnea    Ear Problem     Right ear pain and tinnitus last week        History of Present Illness  Sara Foy is a 49 y.o. female.  Sara Foy is here for follow up of ENT complaints. The patient has had problems with ear pain and ringing of the ears reflux and snoring patient states she is not currently using CPAP but does feel as if symptoms have improved significantly since starting reflux medicines nasal sprays and alternating diet.  Patient feels as if dietary changes have contributed to the part of her improvement in symptoms  She is taking reflux medicines twice daily     Patient does have a skin lesion to the left cheek that she states has been present for a couple months she does have a previous excision of basal cell carcinoma adjacent to area she has a follow-up with dermatology in 2 months  Past Medical History:   Diagnosis Date    Asthma     Chronic pain     Dental disease 2022    Right side of face and jaw constantly sore, ache a lot during covid and when sick.  tender. Cavity top right, back molar.    Dizziness     After getting Covid for the first time  I have had more than 5 or 6 random bouts of  extreme dizziness. And my right ear drum vibrates sometimes.    DVT (deep venous thrombosis)     GERD (gastroesophageal reflux disease)     Headache     I have severe scoliosis. Headaches are chronic. Worse when have ear infections.    High blood pressure     HL (hearing loss)     After getting Covid for first time. Sometimes ears itch.    Low back pain     Peptic ulcer     Scoliosis     Sinusitis     Not disease, but had basal cell skin carcinoma removed from left side nasal cavity area of my cheek. Had Mohs surgery to remove, was deep enough to see bone, and as wide as a quarter.  57 stitches. harder to breathe through ever since. 2021 - had squamous    Skin lesion     Tinnitus 1985    Have had through out my life. Also suffered from chronic ear infections since early childhood. Both ears get frequent infections, right ear always worse.    TMJ dysfunction 2021    After Covid, right side of my face, jaw, ear have been very sore. Ache. I grind my teeth. I have chronic pain and fibromyalgia. Got worse after Covid.       Past Surgical History:   Procedure Laterality Date    FLAP HEAD/NECK N/A 03/10/2021    Procedure: possible flap or graft;  Surgeon: Andrea Ceja MD;  Location:  [facility] OR;  Service: ENT;  Laterality: N/A;    HEAD/NECK LESION/CYST EXCISION N/A 03/10/2021    Procedure: Excision of squamous cell carcinoma in situ of the of the forehead\glabella with transposition flap;  Surgeon: Andrea Ceja MD;  Location:  [facility] OR;  Service: ENT;  Laterality: N/A;    HYSTERECTOMY  2016    LEG SURGERY      SINUS SURGERY  2016/2022    Not sinus surgery - deep and wide Basal Cell removed from left cheek/nasal cavity area. 2021 - Squamous cell removed from inbetween eyebrows.    VASCULAR SURGERY         Outpatient Medications Marked as Taking for the 11/4/24 encounter (Office Visit) with Amber Sanchez APRN   Medication Sig Dispense Refill    albuterol sulfate  (90 Base) MCG/ACT inhaler Inhale 2 puffs 4 (Four) Times a Day.      aspirin 81 MG EC tablet Take 1 tablet every week by oral route for 90 days.      azelastine (ASTELIN) 0.1 % nasal spray 2 sprays into the nostril(s) as directed by provider 2 (Two) Times a Day. Use in each nostril as directed 30 mL 11    buPROPion XL (WELLBUTRIN XL) 150 MG 24 hr tablet Take 1 tablet by mouth Every Morning.      celecoxib (CeleBREX) 50 MG capsule Take 1 capsule by mouth.      cyclobenzaprine (FLEXERIL) 10 MG tablet As Needed.      Diclofenac Sodium 3 % gel gel       ergocalciferol (ERGOCALCIFEROL) 1.25 MG (56898 UT) capsule Take 1 capsule  by mouth 1 (One) Time Per Week.      fluticasone (FLONASE) 50 MCG/ACT nasal spray Administer 2 sprays into the nostril(s) as directed by provider Daily As Needed.      Hyoscyamine Sulfate SL 0.125 MG sublingual tablet Place 1 tablet every 4-6 hours by sublingual route as needed.      ibuprofen (ADVIL,MOTRIN) 200 MG tablet Take 4 tablets every 6 hours by oral route as needed.      loratadine-pseudoephedrine (CLARITIN-D 12-hour) 5-120 MG per 12 hr tablet Take 1 tablet every 12 hours by oral route as needed.      LORazepam (ATIVAN) 1 MG tablet Take 1 tablet by mouth At Night As Needed for Anxiety.      methocarbamol (ROBAXIN) 500 MG tablet methocarbamol 500 mg tablet   Take 1 tablet twice a day by oral route as needed.      multivitamin (THERAGRAN) tablet tablet Take 1 tablet by mouth Daily.      omeprazole (priLOSEC) 40 MG capsule Take 1 capsule by mouth Daily As Needed.      Sucralfate (CARAFATE PO) Take 1 g by mouth Daily As Needed.      VITAMIN D, CHOLECALCIFEROL, PO Take 1 tablet by mouth Daily.         Penicillins, Duloxetine hcl, Doxycycline, Iodinated contrast media, Levaquin [levofloxacin], and Contrast dye (echo or unknown ct/mr)    Family History   Problem Relation Age of Onset    Rheum arthritis Mother     Osteoarthritis Mother     Stroke Mother         Has DVTs and blood clotting disorders    Cancer Father         Has extremely rare form of skin cancer and prostate cancer    Rashes / Skin problems Father         Extremely rare form of skin cancer. Under going cancer treatment in [facility]. Biopsies sent to [place] and MD Fernandez inconclusive,  but have determined it a cancer    Cancer Sister     Cancer Sister     Asthma Sister        Social History     Socioeconomic History    Marital status:    Tobacco Use    Smoking status: Former     Current packs/day: 0.00     Average packs/day: 1 pack/day for 15.0 years (15.0 ttl pk-yrs)     Types: Cigarettes     Start date: 9/17/1992     Quit date:  2006     Years since quittin.4    Smokeless tobacco: Never   Vaping Use    Vaping status: Never Used   Substance and Sexual Activity    Alcohol use: Yes     Alcohol/week: 1.0 standard drink of alcohol     Types: 1 Glasses of wine per week     Comment: Socially - 2 or 3 times a month    Drug use: Not Currently     Types: Marijuana     Comment: no recent substance abuse, only in her teens    Sexual activity: Defer       Review of Systems   Constitutional: Negative.    HENT:  Positive for tinnitus.    Skin:         Admits skin lesion       Vitals:    24 1415   BP: 123/91   Pulse: 99   Temp: 98.4 °F (36.9 °C)       Body mass index is 37.63 kg/m².    Objective     Physical Exam  Vitals reviewed.   Constitutional:       Appearance: She is obese.   HENT:      Head: Normocephalic.        Right Ear: Tympanic membrane, ear canal and external ear normal.      Left Ear: Tympanic membrane, ear canal and external ear normal.      Nose: Septal deviation present.      Mouth/Throat:      Lips: Pink.      Mouth: Mucous membranes are moist.      Comments: [place] [facility]  Neurological:      Mental Status: She is alert.       Excision    Date/Time: 2024 2:08 PM    Performed by: Amber Sanchez APRN  Authorized by: Amber Sanchez APRN    Number of Lesions: 1  Lesion 1:     Body area: head/neck    Head/neck location: L cheek    Initial size (mm): 3    Malignancy: malignancy unknown          Assessment & Plan   Diagnoses and all orders for this visit:    1. Sensorineural hearing loss (SNHL) of both ears (Primary)    2. Tinnitus of both ears    3. Dizziness    4. Daytime somnolence    5. Snoring    6. Laryngopharyngeal reflux    7. Neoplasm of uncertain behavior    8. Skin lesion    Other orders  -     Excision      * Surgery not found *  No orders of the defined types were placed in this encounter.    No follow-ups on file.     Continue reflux medications and dietary modifications as discussed  F/u with dermatology as  scheduled   Call for new/worsening concerns     Patient Instructions   Gastroesophageal Reflux Disease (Laryngopharyngeal Reflux), Adult  Gastroesophageal reflux disease (GERD) and/or Laryngopharyngeal Reflux, (LPR) happens when acid from your stomach flows up into the esophagus and/or throat and voicebox or larynx. When acid comes in contact with the these organs, the acid can cause soreness (inflammation). Over time, GERD may create small holes (ulcers) in the lining of the esophagus and may lead to the development of hoarseness, difficulty swallowing,   feeling of something stuck in the throat, increased mucous or drainage and even predispose to the development of malignancies, (cancer).    CAUSES   Increased body weight. This puts pressure on the stomach, making acid rise from the stomach into the esophagus.  Smoking. This increases acid production in the stomach.  Drinking alcohol. This causes decreased pressure in the lower esophageal sphincter (valve or ring of muscle between the esophagus and stomach), allowing acid from the stomach into the esophagus.  Late evening meals and a full stomach. This increases pressure and acid production in the stomach.  A malformed lower esophageal sphincter  Diet which can include avoidance of gluten and dairy products  Age  SYMPTOMS   Burning pain in the lower part of the mid-chest behind the breastbone and in the mid-stomach area. This may occur twice a week or more often.  Trouble swallowing.  Sore throat.  Dry cough.  Asthma-like symptoms including chest tightness, shortness of breath, or wheezing.  Globus sensation-something stuck in the throat/fullness  Hoarseness  DIAGNOSIS   Your caregiver may be able to diagnose GERD based on your symptoms. In some cases, X-rays and other tests may be done to check for complications or to check the condition of your stomach and esophagus.  You may need to see another doctor.  TREATMENT   Over-the-counter or prescription medicines to  help decrease acid production.   Dietary and behavioral modifications or changes may be also recommended.  HOME CARE INSTRUCTIONS   Change the factors that you can control. Ask your caregiver for guidance concerning weight loss, quitting smoking, and alcohol consumption.  Avoid foods and drinks that make your symptoms worse, and MAY include such as:  Caffeine or alcoholic drinks.  Chocolate.  Gluten containing foods  Dairy  Peppermint or mint flavorings.  Garlic and onions.  Spicy foods.  Citrus fruits, such as oranges, apple, or limes.  Tomato-based foods such as sauce, chili, salsa, and pizza.  Fried and fatty foods.  Avoid lying down for the 3 hours prior to your bedtime or prior to taking a nap.  Eat small, frequent meals instead of large meals.  Wear loose-fitting clothing. Do not wear anything tight around your waist that causes pressure on your stomach.  Raise the head of your bed 6 to 8 inches with wood blocks to help you sleep. Extra pillows will not help.  Only take over-the-counter or prescription medicines for pain, discomfort, or fever as directed by your caregiver.  Do not take aspirin, ibuprofen, or other nonsteroidal anti-inflammatory drugs if possible (NSAIDs).  SEEK IMMEDIATE MEDICAL CARE IF:   You have pain in your arms, neck, jaw, teeth, or back.  Your pain increases or changes in intensity or duration.  You develop nausea, vomiting, or sweating (diaphoresis).  You develop shortness of breath, or you faint.  Your vomit is green, yellow, black, or looks like coffee grounds or blood.  Your stool is red, bloody, or black.  These symptoms could be signs of other problems, such as heart disease, gastric bleeding, or esophageal bleeding.  MAKE SURE YOU:   Understand these instructions.  Will watch your condition.  Will get help right away if you are not doing well or get worse.     This information is not intended to replace advice given to you by your physician. Make sure you discuss any questions  you have with your health care provider.     Modified by Andrea Ceja MD, FACS 9/8/2016.  Document Released: 09/27/2006 Document Revised: 01/08/2016 Document Reviewed: 04/13/2016  [employer] Interactive Patient Education ©2016 [employer]

## 2024-11-05 NOTE — PATIENT INSTRUCTIONS
Gastroesophageal Reflux Disease (Laryngopharyngeal Reflux), Adult  Gastroesophageal reflux disease (GERD) and/or Laryngopharyngeal Reflux, (LPR) happens when acid from your stomach flows up into the esophagus and/or throat and voicebox or larynx. When acid comes in contact with the these organs, the acid can cause soreness (inflammation). Over time, GERD may create small holes (ulcers) in the lining of the esophagus and may lead to the development of hoarseness, difficulty swallowing,   feeling of something stuck in the throat, increased mucous or drainage and even predispose to the development of malignancies, (cancer).    CAUSES   Increased body weight. This puts pressure on the stomach, making acid rise from the stomach into the esophagus.  Smoking. This increases acid production in the stomach.  Drinking alcohol. This causes decreased pressure in the lower esophageal sphincter (valve or ring of muscle between the esophagus and stomach), allowing acid from the stomach into the esophagus.  Late evening meals and a full stomach. This increases pressure and acid production in the stomach.  A malformed lower esophageal sphincter  Diet which can include avoidance of gluten and dairy products  Age  SYMPTOMS   Burning pain in the lower part of the mid-chest behind the breastbone and in the mid-stomach area. This may occur twice a week or more often.  Trouble swallowing.  Sore throat.  Dry cough.  Asthma-like symptoms including chest tightness, shortness of breath, or wheezing.  Globus sensation-something stuck in the throat/fullness  Hoarseness  DIAGNOSIS   Your caregiver may be able to diagnose GERD based on your symptoms. In some cases, X-rays and other tests may be done to check for complications or to check the condition of your stomach and esophagus.  You may need to see another doctor.  TREATMENT   Over-the-counter or prescription medicines to help decrease acid production.   Dietary and behavioral modifications  or changes may be also recommended.  HOME CARE INSTRUCTIONS   Change the factors that you can control. Ask your caregiver for guidance concerning weight loss, quitting smoking, and alcohol consumption.  Avoid foods and drinks that make your symptoms worse, and MAY include such as:  Caffeine or alcoholic drinks.  Chocolate.  Gluten containing foods  Dairy  Peppermint or mint flavorings.  Garlic and onions.  Spicy foods.  Citrus fruits, such as oranges, apple, or limes.  Tomato-based foods such as sauce, chili, salsa, and pizza.  Fried and fatty foods.  Avoid lying down for the 3 hours prior to your bedtime or prior to taking a nap.  Eat small, frequent meals instead of large meals.  Wear loose-fitting clothing. Do not wear anything tight around your waist that causes pressure on your stomach.  Raise the head of your bed 6 to 8 inches with wood blocks to help you sleep. Extra pillows will not help.  Only take over-the-counter or prescription medicines for pain, discomfort, or fever as directed by your caregiver.  Do not take aspirin, ibuprofen, or other nonsteroidal anti-inflammatory drugs if possible (NSAIDs).  SEEK IMMEDIATE MEDICAL CARE IF:   You have pain in your arms, neck, jaw, teeth, or back.  Your pain increases or changes in intensity or duration.  You develop nausea, vomiting, or sweating (diaphoresis).  You develop shortness of breath, or you faint.  Your vomit is green, yellow, black, or looks like coffee grounds or blood.  Your stool is red, bloody, or black.  These symptoms could be signs of other problems, such as heart disease, gastric bleeding, or esophageal bleeding.  MAKE SURE YOU:   Understand these instructions.  Will watch your condition.  Will get help right away if you are not doing well or get worse.     This information is not intended to replace advice given to you by your physician. Make sure you discuss any questions you have with your health care provider.     Modified by Andrea Ceja,  MD, FACS 9/8/2016.  Document Released: 09/27/2006 Document Revised: 01/08/2016 Document Reviewed: 04/13/2016  [employer] Interactive Patient Education ©2016 [employer]

## 2025-01-20 DIAGNOSIS — M17.0 PRIMARY OSTEOARTHRITIS OF BOTH KNEES: Primary | ICD-10-CM

## 2025-02-03 ENCOUNTER — HOSPITAL ENCOUNTER (EMERGENCY)
Age: 50
Discharge: HOME | End: 2025-02-03
Attending: PEDIATRICS
Payer: MEDICAID

## 2025-02-03 ENCOUNTER — OFFICE VISIT (OUTPATIENT)
Dept: NEUROLOGY | Age: 50
End: 2025-02-03
Payer: MEDICAID

## 2025-02-03 VITALS
HEART RATE: 88 BPM | OXYGEN SATURATION: 97 % | HEIGHT: 64 IN | SYSTOLIC BLOOD PRESSURE: 131 MMHG | WEIGHT: 239 LBS | DIASTOLIC BLOOD PRESSURE: 92 MMHG | BODY MASS INDEX: 40.8 KG/M2

## 2025-02-03 VITALS
RESPIRATION RATE: 16 BRPM | TEMPERATURE: 98.5 F | DIASTOLIC BLOOD PRESSURE: 81 MMHG | SYSTOLIC BLOOD PRESSURE: 141 MMHG | OXYGEN SATURATION: 98 % | HEIGHT: 64 IN | BODY MASS INDEX: 40.8 KG/M2 | WEIGHT: 239 LBS | HEART RATE: 78 BPM

## 2025-02-03 DIAGNOSIS — M62.838 MUSCLE SPASM: ICD-10-CM

## 2025-02-03 DIAGNOSIS — F39 MOOD DISORDER (HCC): ICD-10-CM

## 2025-02-03 DIAGNOSIS — R20.2 NUMBNESS AND TINGLING: Primary | ICD-10-CM

## 2025-02-03 DIAGNOSIS — M54.9 CHRONIC BACK PAIN, UNSPECIFIED BACK LOCATION, UNSPECIFIED BACK PAIN LATERALITY: Primary | ICD-10-CM

## 2025-02-03 DIAGNOSIS — F32.A DEPRESSION WITH SUICIDAL IDEATION: ICD-10-CM

## 2025-02-03 DIAGNOSIS — R45.851 DEPRESSION WITH SUICIDAL IDEATION: ICD-10-CM

## 2025-02-03 DIAGNOSIS — G89.29 CHRONIC BACK PAIN, UNSPECIFIED BACK LOCATION, UNSPECIFIED BACK PAIN LATERALITY: Primary | ICD-10-CM

## 2025-02-03 DIAGNOSIS — R20.0 NUMBNESS AND TINGLING: Primary | ICD-10-CM

## 2025-02-03 LAB
ALBUMIN SERPL-MCNC: 4.2 G/DL (ref 3.5–5.2)
ALP SERPL-CCNC: 74 U/L (ref 35–104)
ALT SERPL-CCNC: 13 U/L (ref 5–33)
AMPHET UR QL SCN: NEGATIVE
ANION GAP SERPL CALCULATED.3IONS-SCNC: 10 MMOL/L (ref 8–16)
AST SERPL-CCNC: 11 U/L (ref 5–32)
BARBITURATES UR QL SCN: NEGATIVE
BASOPHILS # BLD: 0.1 K/UL (ref 0–0.2)
BASOPHILS NFR BLD: 0.5 % (ref 0–1)
BENZODIAZ UR QL SCN: POSITIVE
BILIRUB SERPL-MCNC: 0.4 MG/DL (ref 0.2–1.2)
BUN SERPL-MCNC: 19 MG/DL (ref 6–20)
BUPRENORPHINE URINE: NEGATIVE
CALCIUM SERPL-MCNC: 9.2 MG/DL (ref 8.6–10)
CANNABINOIDS UR QL SCN: POSITIVE
CHLORIDE SERPL-SCNC: 107 MMOL/L (ref 98–107)
CO2 SERPL-SCNC: 25 MMOL/L (ref 22–29)
COCAINE UR QL SCN: NEGATIVE
CREAT SERPL-MCNC: 0.9 MG/DL (ref 0.5–0.9)
DRUG SCREEN COMMENT UR-IMP: ABNORMAL
EOSINOPHIL # BLD: 0.1 K/UL (ref 0–0.6)
EOSINOPHIL NFR BLD: 0.8 % (ref 0–5)
ERYTHROCYTE [DISTWIDTH] IN BLOOD BY AUTOMATED COUNT: 12.5 % (ref 11.5–14.5)
ETHANOLAMINE SERPL-MCNC: <10 MG/DL (ref 0–10)
FENTANYL SCREEN, URINE: NEGATIVE
GLUCOSE SERPL-MCNC: 98 MG/DL (ref 70–99)
HCT VFR BLD AUTO: 45 % (ref 37–47)
HGB BLD-MCNC: 15.4 G/DL (ref 12–16)
IMM GRANULOCYTES # BLD: 0.1 K/UL
LYMPHOCYTES # BLD: 2.5 K/UL (ref 1.1–4.5)
LYMPHOCYTES NFR BLD: 18.6 % (ref 20–40)
MCH RBC QN AUTO: 31.3 PG (ref 27–31)
MCHC RBC AUTO-ENTMCNC: 34.2 G/DL (ref 33–37)
MCV RBC AUTO: 91.5 FL (ref 81–99)
METHADONE UR QL SCN: NEGATIVE
METHAMPHETAMINE, URINE: NEGATIVE
MONOCYTES # BLD: 0.9 K/UL (ref 0–0.9)
MONOCYTES NFR BLD: 6.6 % (ref 0–10)
NEUTROPHILS # BLD: 9.8 K/UL (ref 1.5–7.5)
NEUTS SEG NFR BLD: 73 % (ref 50–65)
OPIATES UR QL SCN: NEGATIVE
OXYCODONE UR QL SCN: NEGATIVE
PCP UR QL SCN: NEGATIVE
PLATELET # BLD AUTO: 345 K/UL (ref 130–400)
PMV BLD AUTO: 9.5 FL (ref 9.4–12.3)
POTASSIUM SERPL-SCNC: 3.9 MMOL/L (ref 3.5–5.1)
PROT SERPL-MCNC: 7.1 G/DL (ref 6.4–8.3)
RBC # BLD AUTO: 4.92 M/UL (ref 4.2–5.4)
SARS-COV-2 RDRP RESP QL NAA+PROBE: NOT DETECTED
SODIUM SERPL-SCNC: 142 MMOL/L (ref 136–145)
TRICYCLIC ANTIDEPRESSANTS, UR: NEGATIVE
WBC # BLD AUTO: 13.5 K/UL (ref 4.8–10.8)

## 2025-02-03 PROCEDURE — G8427 DOCREV CUR MEDS BY ELIG CLIN: HCPCS | Performed by: NURSE PRACTITIONER

## 2025-02-03 PROCEDURE — 80307 DRUG TEST PRSMV CHEM ANLYZR: CPT

## 2025-02-03 PROCEDURE — 99205 OFFICE O/P NEW HI 60 MIN: CPT | Performed by: NURSE PRACTITIONER

## 2025-02-03 PROCEDURE — 1036F TOBACCO NON-USER: CPT | Performed by: NURSE PRACTITIONER

## 2025-02-03 PROCEDURE — 82077 ASSAY SPEC XCP UR&BREATH IA: CPT

## 2025-02-03 PROCEDURE — 80053 COMPREHEN METABOLIC PANEL: CPT

## 2025-02-03 PROCEDURE — G8417 CALC BMI ABV UP PARAM F/U: HCPCS | Performed by: NURSE PRACTITIONER

## 2025-02-03 PROCEDURE — 87635 SARS-COV-2 COVID-19 AMP PRB: CPT

## 2025-02-03 PROCEDURE — 99283 EMERGENCY DEPT VISIT LOW MDM: CPT

## 2025-02-03 PROCEDURE — 36415 COLL VENOUS BLD VENIPUNCTURE: CPT

## 2025-02-03 PROCEDURE — 99284 EMERGENCY DEPT VISIT MOD MDM: CPT | Performed by: PSYCHIATRY & NEUROLOGY

## 2025-02-03 PROCEDURE — 85025 COMPLETE CBC W/AUTO DIFF WBC: CPT

## 2025-02-03 PROCEDURE — G0480 DRUG TEST DEF 1-7 CLASSES: HCPCS
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
## 2025-02-03 NOTE — DISCHARGE INSTRUCTIONS
Follow-up with your primary care provider and with your therapist this week, if possible.  Return or seek medical attention with thoughts of harming yourself, thoughts of harming others, or other concerns.

## 2025-02-03 NOTE — PROGRESS NOTES
0956- Notified by provider pt needs mental health evaluation and to get EMS in route.  0956-Called [facility] in route  1008- [facility] arrival patient care transferred to them. Pt ambulated out of clinic without difficulty.     
REVIEW OF SYSTEMS    Constitutional: []Fever []Sweats []Chills [] Recent Injury [x] Denies all unless marked  HEENT:[]Headache  [] Head Injury [] Hearing Loss  [] Sore Throat  [] Ear Ache [x] Denies all unless marked  Spine:  [] Neck pain  [] Back pain  [] Sciaticia  [x] Denies all unless marked  Cardiovascular:[]Heart Disease []Palpitations [] Chest Pain   [x] Denies all unless marked  Pulmonary: []Shortness of Breath []Cough   [x] Denies all unless marked  Psychiatric/Behavioral:[] Depression [] Anxiety [x] Denies all unless marked  Gastrointestinal: []Nausea  []Vomiting  []Abdominal Pain  []Constipation  []Diarrhea  [x] Denies all unless marked  Genitourinary:   [] Frequency  [] Urgency  [] Dysuria [] Incontinence  [x] Denies all unless marked  Extremities: []Pain  []Swelling  [x] Denies all unless marked  Musculoskeletal: [] Myalgias  [] Joint Pain  [] Arthritis [] Muscle Cramps [] Muscle Twitches  [x] Denies all unless marked  Sleep: []Insomnia[]Snoring []Restless Legs  []Sleep Apnea  []Daytime Sleepiness  [x] Denies all unless marked  Skin:[] Rash [] Color Change [x] Denies all unless marked   Neurological:[]Visual Disturbance [] Memory Loss []Loss of Balance []Slurred Speech []Weakness []Seizures  [] Dizziness [x] Denies all unless marked    
muscle spasms in various areas, including the thoracic spine, chest, stomach, and legs. She is currently using tizanidine and Robaxin as needed. She is advised to continue these medications. A nerve conduction study will be conducted to further evaluate the cause of her muscle spasms.  Could consider trial of baclofen.    4. Numbness and Tingling.  The patient reports numbness and tingling in her fingers, arms, toes, legs which varies day to day. A nerve conduction study will be ordered to assess for potential nerve damage. An MRI of the lumbar spine will also be conducted to evaluate for any spinal causes of her symptoms.  Additional lab work will be completed as well to rule out secondary sources.    5. Suicidal Ideation.  The patient reports daily suicidal thoughts but has no current plan or intention to harm herself. Reports having an appt on Thursday with psychiatry but feels she cannot wait that long. She will be evaluated by a mental health professional in the ER today for further assessment and management. EMS transporting patient to ER. Report given to Jorge A Crooks RN in the ER.         Diagnosis Orders   1. Numbness and tingling  MALCOM Screen with Reflex    CBC with Auto Differential    Comprehensive Metabolic Panel    C-Reactive Protein    Heavy metals screen    Hemoglobin A1C    HIV Screen    Vitamin B12    Sedimentation Rate    Rheumatoid Factor    Electrophoresis Protein, Serum    Nerve Conduction Test with EMG    MRI THORACIC SPINE WO CONTRAST    MRI LUMBAR SPINE WO CONTRAST    CK    Aldolase    Myoglobin, Blood      2. Muscle spasm  Nerve Conduction Test with EMG    CK    Aldolase    Myoglobin, Blood      3. Depression with suicidal ideation             Patsy Benjamin DNP, APRN     This dictation was generated by voice recognition computer software.  Although all attempts are made to edit the dictation for accuracy, there may be errors in the transcription that are not intended.     The patient (or

## 2025-02-05 NOTE — ED PROVIDER NOTES
[facility] EMERGENCY DEPARTMENT  eMERGENCY dEPARTMENT eNCOUnter      Pt Name: Olimpia Pierre  MRN: [id number]  Birthdate 1975  Date of evaluation: 2/3/2025  Provider: Josie Romero MD    CHIEF COMPLAINT       Chief Complaint   Patient presents with    Suicidal     Sent from [facility] for suicidal ideation         HISTORY OF PRESENT ILLNESS   (Location/Symptom, Timing/Onset,Context/Setting, Quality, Duration, Modifying Factors, Severity)  Note limiting factors.   Olimpia Pierre is a 49 y.o. female who presents to the emergency department with suicidal ideation.  Patient adamantly denies that she is suicidal.  Patient states that she went to neurology clinic to be seen for her chronic back pain.  Patient states \"I am hurting all the time.\"  Patient states that she was crying while she was at neurology clinic.  She admits to having occasional suicidal thoughts but states \"that is not who I am.  I would never commit suicide.\"  Patient states that she has been depressed because she is unable to work and she does not have the money to pay for the treatment modalities that she believes will help with her pain.  Patient states that in addition to scoliosis that she has now been diagnosed with fibromyalgia.  Patient states that she has significant disability due to her irritable bowel syndrome and has difficulty attending appointments due to diarrhea.  Patient states \"when I saw Dr. Ross years ago he asked me ' are you an idiot?'\"  Patient states that this comment occurred when she thought he was telling her that she might get better over time and has completely dashed her hopes for improvement of her pain.  Patient describes how she has been searching every possible avenue for relief but so far none have been successful.  She was seen by HUONG Esquivel, in neurology clinic today who plans to do further neurologic testing to delineate her symptoms.  Patient is to have nerve conduction testing with

## 2025-02-06 ENCOUNTER — OFFICE VISIT (OUTPATIENT)
Age: 50
End: 2025-02-06

## 2025-02-06 VITALS — HEIGHT: 64 IN | BODY MASS INDEX: 40.8 KG/M2 | WEIGHT: 239 LBS

## 2025-02-06 DIAGNOSIS — M17.0 BILATERAL PRIMARY OSTEOARTHRITIS OF KNEE: Primary | ICD-10-CM

## 2025-02-06 RX ORDER — BUPIVACAINE HYDROCHLORIDE 5 MG/ML
4 INJECTION, SOLUTION PERINEURAL ONCE
Status: COMPLETED | OUTPATIENT
Start: 2025-02-06 | End: 2025-02-06

## 2025-02-06 RX ORDER — BUPIVACAINE HYDROCHLORIDE 5 MG/ML
30 INJECTION, SOLUTION PERINEURAL ONCE
Status: DISCONTINUED | OUTPATIENT
Start: 2025-02-06 | End: 2025-02-06

## 2025-02-06 RX ADMIN — BUPIVACAINE HYDROCHLORIDE 20 MG: 5 INJECTION, SOLUTION PERINEURAL at 10:30

## 2025-02-07 ENCOUNTER — TELEPHONE (OUTPATIENT)
Age: 50
End: 2025-02-07

## 2025-02-07 NOTE — TELEPHONE ENCOUNTER
I contacted patient she reported her knees are swollen and red since she had duralane injections on Thursday. Encouraged patient to use ice no heat and to go to the emergency room if she feels the pain continues to be intensed. Patient is taking ibuprophen and benedryl OTC. I also told patient we would see her at 10am on Monday if she did not have any improvement. Patient verbalized understanding

## 2025-02-10 ENCOUNTER — TELEPHONE (OUTPATIENT)
Age: 50
End: 2025-02-10

## 2025-02-10 NOTE — TELEPHONE ENCOUNTER
I contacted patient to follow up from her phone call on 2/8/25. Patient reports \"the ice has helped tremendously and I think I am better\". She reports no fever, no redness and no swelling. Patient reported she did not have the money to come in for appointment and feels she is improving. Encouraged patient to call if there was any concerns she had. Patient verbalized understanding

## 2025-02-12 ENCOUNTER — OFFICE VISIT (OUTPATIENT)
Age: 50
End: 2025-02-12
Payer: MEDICAID

## 2025-02-12 VITALS — WEIGHT: 239 LBS | BODY MASS INDEX: 40.8 KG/M2 | HEIGHT: 64 IN

## 2025-02-12 DIAGNOSIS — M17.0 BILATERAL PRIMARY OSTEOARTHRITIS OF KNEE: Primary | ICD-10-CM

## 2025-02-12 PROCEDURE — G8417 CALC BMI ABV UP PARAM F/U: HCPCS | Performed by: PHYSICIAN ASSISTANT

## 2025-02-12 PROCEDURE — 99214 OFFICE O/P EST MOD 30 MIN: CPT | Performed by: PHYSICIAN ASSISTANT

## 2025-02-12 PROCEDURE — G8427 DOCREV CUR MEDS BY ELIG CLIN: HCPCS | Performed by: PHYSICIAN ASSISTANT

## 2025-02-12 PROCEDURE — 1036F TOBACCO NON-USER: CPT | Performed by: PHYSICIAN ASSISTANT

## 2025-02-12 RX ORDER — METHYLPREDNISOLONE 4 MG/1
TABLET ORAL
Qty: 1 KIT | Refills: 0 | Status: SHIPPED | OUTPATIENT
Start: 2025-02-12

## 2025-02-12 ASSESSMENT — ENCOUNTER SYMPTOMS: COLOR CHANGE: 0

## 2025-02-12 NOTE — ASSESSMENT & PLAN NOTE
We will send the patient a Medrol Dosepak and she is to use over-the-counter Benadryl and Zyrtec for the next 2 weeks.  She cannot take anti-inflammatories due to a history of stomach ulcers.  She is to frequently ice and elevate the knees.  We will see her back as needed.

## 2025-02-12 NOTE — PROGRESS NOTES
[facility]  [facility]  [address] [facility] [place] [address]  Dept: [phone]  Dept Fax: [phone]  Loc: [phone]    Olimpia Pierre is a 49 y.o. female who presents today for her medical conditions/complaints as noted below.  Olimpia Pierre is complaining of Follow-up (Bilateral Knee)        HPI:   Patient is a-year-old female presenting to the clinic today for follow-up on bilateral knee pain. She received bilateral viscosupplementation about 2 weeks ago and has noted increased pain and swelling in the knees since.  She denies any new injury or trauma to the knees or any catching or locking of the knees.        Past Medical History:   Diagnosis Date    ADHD     Arthritis     spine    Asthma     Cancer (HCC) ,    skin    Celiac disease     Chronic pain     Depression with anxiety     Fibromyalgia     Fibromyositis     GERD (gastroesophageal reflux disease)     IBS (irritable bowel syndrome)     Kyphosis     Osteoarthritis     Scoliosis     Spinal stenosis     Spondylolisthesis        Past Surgical History:   Procedure Laterality Date    COLONOSCOPY          HYSTERECTOMY, VAGINAL      left one ovary       Family History   Problem Relation Age of Onset    Endometrial Cancer Mother     Clotting Disorder Mother         has DVT & TI's    Osteoporosis Mother     Rheumatologic Disease Mother     Colon Cancer Sister     Endometrial Cancer Sister     Cancer Sister         Colon - alive    Rheumatologic Disease Sister     Scoliosis Sister     Colon Cancer Maternal Uncle     Cancer Sister         Endometrial -Dead       Social History     Tobacco Use    Smoking status: Former     Current packs/day: 0.00     Average packs/day: 1 pack/day for 14.8 years (14.8 ttl pk-yrs)     Types: Cigarettes     Start date: 1991     Quit date: 2006     Years since quittin.7    Smokeless tobacco: Never   Substance Use Topics    Alcohol use: Yes

## 2025-02-18 ENCOUNTER — TELEPHONE (OUTPATIENT)
Dept: NEUROLOGY | Age: 50
End: 2025-02-18

## 2025-02-18 DIAGNOSIS — M62.838 MUSCLE SPASM: ICD-10-CM

## 2025-02-18 DIAGNOSIS — M54.50 CHRONIC BILATERAL LOW BACK PAIN WITHOUT SCIATICA: ICD-10-CM

## 2025-02-18 DIAGNOSIS — M54.2 NECK PAIN: ICD-10-CM

## 2025-02-18 DIAGNOSIS — G89.29 CHRONIC BILATERAL LOW BACK PAIN WITHOUT SCIATICA: ICD-10-CM

## 2025-02-18 DIAGNOSIS — R20.0 NUMBNESS AND TINGLING: Primary | ICD-10-CM

## 2025-02-18 DIAGNOSIS — R20.2 NUMBNESS AND TINGLING: Primary | ICD-10-CM

## 2025-02-18 NOTE — TELEPHONE ENCOUNTER
Patient's MRIs need peer to peer   Case #[id number]  Case #[id number]    Phone number [phone]

## 2025-02-20 NOTE — TELEPHONE ENCOUNTER
Called to set up peer to peer. Was not able to do peer to peer, per insurance they are requiring pt to try 6 weeks of PT to be approve the MRI thoracic and lumbar spine. Please advise

## 2025-03-03 DIAGNOSIS — R20.0 NUMBNESS AND TINGLING: ICD-10-CM

## 2025-03-03 DIAGNOSIS — M62.838 MUSCLE SPASM: ICD-10-CM

## 2025-03-03 DIAGNOSIS — R20.2 NUMBNESS AND TINGLING: ICD-10-CM

## 2025-03-03 LAB
ALBUMIN SERPL-MCNC: 4 G/DL (ref 3.5–5.2)
ALP SERPL-CCNC: 69 U/L (ref 35–104)
ALT SERPL-CCNC: 15 U/L (ref 5–33)
ANION GAP SERPL CALCULATED.3IONS-SCNC: 14 MMOL/L (ref 8–16)
AST SERPL-CCNC: 13 U/L (ref 5–32)
BASOPHILS # BLD: 0.1 K/UL (ref 0–0.2)
BASOPHILS NFR BLD: 0.7 % (ref 0–1)
BILIRUB SERPL-MCNC: 0.2 MG/DL (ref 0.2–1.2)
BUN SERPL-MCNC: 14 MG/DL (ref 6–20)
CALCIUM SERPL-MCNC: 9.1 MG/DL (ref 8.6–10)
CHLORIDE SERPL-SCNC: 103 MMOL/L (ref 98–107)
CK SERPL-CCNC: 59 U/L (ref 26–192)
CO2 SERPL-SCNC: 22 MMOL/L (ref 22–29)
CREAT SERPL-MCNC: 0.9 MG/DL (ref 0.5–0.9)
CRP SERPL HS-MCNC: <0.3 MG/DL (ref 0–0.5)
EOSINOPHIL # BLD: 0.1 K/UL (ref 0–0.6)
EOSINOPHIL NFR BLD: 1.7 % (ref 0–5)
ERYTHROCYTE [DISTWIDTH] IN BLOOD BY AUTOMATED COUNT: 12.5 % (ref 11.5–14.5)
ERYTHROCYTE [SEDIMENTATION RATE] IN BLOOD BY WESTERGREN METHOD: 7 MM/HR (ref 0–20)
GLUCOSE SERPL-MCNC: 109 MG/DL (ref 70–99)
HBA1C MFR BLD: 5.3 % (ref 4–5.6)
HCT VFR BLD AUTO: 43 % (ref 37–47)
HGB BLD-MCNC: 14.8 G/DL (ref 12–16)
HIV-1 P24 AG: NORMAL
HIV1+2 AB SERPLBLD QL IA.RAPID: NORMAL
IMM GRANULOCYTES # BLD: 0 K/UL
LYMPHOCYTES # BLD: 1.8 K/UL (ref 1.1–4.5)
LYMPHOCYTES NFR BLD: 22.8 % (ref 20–40)
MCH RBC QN AUTO: 31.6 PG (ref 27–31)
MCHC RBC AUTO-ENTMCNC: 34.4 G/DL (ref 33–37)
MCV RBC AUTO: 91.9 FL (ref 81–99)
MONOCYTES # BLD: 0.5 K/UL (ref 0–0.9)
MONOCYTES NFR BLD: 6.8 % (ref 0–10)
NEUTROPHILS # BLD: 5.2 K/UL (ref 1.5–7.5)
NEUTS SEG NFR BLD: 67.9 % (ref 50–65)
PLATELET # BLD AUTO: 303 K/UL (ref 130–400)
PMV BLD AUTO: 10.6 FL (ref 9.4–12.3)
POTASSIUM SERPL-SCNC: 3.6 MMOL/L (ref 3.5–5.1)
PROT SERPL-MCNC: 6.7 G/DL (ref 6.4–8.3)
RBC # BLD AUTO: 4.68 M/UL (ref 4.2–5.4)
RHEUMATOID FACT SER NEPH-ACNC: <10 IU/ML
SODIUM SERPL-SCNC: 139 MMOL/L (ref 136–145)
VIT B12 SERPL-MCNC: 257 PG/ML (ref 232–1245)
WBC # BLD AUTO: 7.7 K/UL (ref 4.8–10.8)

## 2025-03-06 ENCOUNTER — PATIENT MESSAGE (OUTPATIENT)
Dept: NEUROLOGY | Age: 50
End: 2025-03-06

## 2025-03-06 LAB
ALBUMIN SERPL-MCNC: 3.9 G/DL (ref 3.75–5.01)
ALDOLASE SERPL-CCNC: 3.7 U/L (ref 1.2–7.6)
ALPHA1 GLOB SERPL ELPH-MCNC: 0.19 G/DL (ref 0.19–0.46)
ALPHA2 GLOB SERPL ELPH-MCNC: 0.67 G/DL (ref 0.48–1.05)
ARSENIC BLD-MCNC: <10 UG/L
B-GLOBULIN SERPL ELPH-MCNC: 0.84 G/DL (ref 0.48–1.1)
CADMIUM BLD-MCNC: <1 UG/L
GAMMA GLOB SERPL ELPH-MCNC: 0.79 G/DL (ref 0.62–1.51)
INTERPRETATION SERPL IFE-IMP: NORMAL
LEAD BLD-MCNC: <2 UG/DL
MERCURY BLD-MCNC: <2.5 UG/L
MYOGLOBIN SERPL-MCNC: 29 NG/ML
NUCLEAR IGG SER QL IA: NORMAL
PROT SERPL-MCNC: 6.4 G/DL (ref 6.3–8.2)
PROTEIN ELECTROPHORESIS, SERUM: NORMAL

## 2025-03-13 ENCOUNTER — APPOINTMENT (OUTPATIENT)
Dept: MRI IMAGING | Age: 50
End: 2025-03-13
Payer: MEDICAID

## 2025-03-13 ENCOUNTER — HOSPITAL ENCOUNTER (OUTPATIENT)
Dept: NEUROLOGY | Age: 50
Discharge: HOME | End: 2025-03-13
Payer: MEDICAID

## 2025-03-13 DIAGNOSIS — R20.0 NUMBNESS AND TINGLING: ICD-10-CM

## 2025-03-13 DIAGNOSIS — M62.838 MUSCLE SPASM: ICD-10-CM

## 2025-03-13 DIAGNOSIS — R20.2 NUMBNESS AND TINGLING: ICD-10-CM

## 2025-03-13 PROCEDURE — 95913 NRV CNDJ TEST 13/> STUDIES: CPT

## 2025-03-13 PROCEDURE — 95886 MUSC TEST DONE W/N TEST COMP: CPT

## 2025-03-17 NOTE — TELEPHONE ENCOUNTER
Recent NCS/EMG showed mild bilateral carpal tunnel syndrome.  This is not an issue with her neck or her shoulders, this is nerve entrapment at both of her wrist.  Given that it is mild in nature, I would recommend carpal tunnel wrist splints to be worn at nighttime.  I do not think that this will help with MRI cervical spine approval.  We can always do more physical therapy for the pain if it has been beneficial in the past.

## 2025-03-26 ENCOUNTER — OFFICE VISIT (OUTPATIENT)
Dept: OTOLARYNGOLOGY | Facility: CLINIC | Age: 50
End: 2025-03-26
Payer: COMMERCIAL

## 2025-03-26 ENCOUNTER — PROCEDURE VISIT (OUTPATIENT)
Dept: OTOLARYNGOLOGY | Facility: CLINIC | Age: 50
End: 2025-03-26
Payer: COMMERCIAL

## 2025-03-26 VITALS — WEIGHT: 244 LBS | HEIGHT: 66 IN | BODY MASS INDEX: 39.21 KG/M2

## 2025-03-26 DIAGNOSIS — H90.3 SENSORINEURAL HEARING LOSS (SNHL) OF BOTH EARS: Primary | ICD-10-CM

## 2025-03-26 DIAGNOSIS — H93.13 TINNITUS OF BOTH EARS: ICD-10-CM

## 2025-03-26 DIAGNOSIS — K21.9 LARYNGOPHARYNGEAL REFLUX: ICD-10-CM

## 2025-03-26 DIAGNOSIS — R40.0 DAYTIME SOMNOLENCE: ICD-10-CM

## 2025-03-26 DIAGNOSIS — R42 DIZZINESS: ICD-10-CM

## 2025-03-26 RX ORDER — RIFAXIMIN 550 MG/1
1 TABLET ORAL 3 TIMES DAILY
COMMUNITY
Start: 2025-03-18

## 2025-03-26 NOTE — PROGRESS NOTES
AUDIOMETRIC EVALUATION      Name:  Sara Foy  :  1975  Age:  49 y.o.  Date of Evaluation:  2025       History:  Ms. Foy is seen today at the request of LEANDRO Alcala for a follow-up hearing evaluation. Patient has a history of bilateral sensorineural hearing loss. Previous audio was on 2024.    Audiologic Information:  PETs: No  Other otologic surgical history: No  Aural Pressure/Fullness: Right  Otalgia: Right  Otorrhea: No  Tinnitus: Constant bilateral ringing  Dizziness: Intermittent lightheaded  Other significant history: fibromyalgia, MITCH    **Case history obtained in office and through EMR system      EVALUATION:        RESULTS:    Otoscopic Evaluation:  Right: minimal cerumen, tympanic membrane visualized  Left: minimal cerumen, tympanic membrane visualized    Tympanometry (226 Hz):  Right: Type A  Left: Type A    Pure Tone Audiometry:    Right: Mild (250Hz-1000Hz) sloping to moderate at 2000Hz sensorineural hearing loss, rising to normal thresholds  Left: Normal hearing thresholds with a moderate sensorineural notch at 2000Hz    Speech Audiometry:   Right: Speech Reception Threshold (SRT) was obtained at 35 dB HL  Word Recognition scores - Excellent (100)% at 75 dB with 55 dB of contralateral masking, using NU-6 List 1A with 10 words  Left: Speech Reception Threshold (SRT) was obtained at 30 dB HL  Word Recognition scores - Excellent (100)% at 70 dB with 50 dB of contralateral masking, using NU-6 List 1A with 10 words  SRT/PTA in good agreement bilaterally.    IMPRESSIONS:  Tympanometry showed normal middle ear pressure and static compliance, consistent with normal middle ear function for both ears. Pure tone thresholds for both ears show sensorineural hearing loss, suggesting normal outer/middle ear function and abnormal cochlear/retrocochlear function. Patient was counseled with regard to the findings.    Amplification needs:  Patient could benefit from amplification  if they feel increased communication difficulties.    Diagnosis:  1. Sensorineural hearing loss (SNHL) of both ears    2. Tinnitus of both ears         RECOMMENDATIONS/PLAN:  Follow-up recommendations per Amber Sanchez, KALYANI-APRN.  Practice good communication strategies to assist with everyday listening (eye contact with speakers, reduce background noise, encourage others to communicate clearly and slowly).  Consistent utilization of hearing protection devices in noisy environments.  Repeat hearing evaluation if changes in hearing are noted or concerns arise.  Discussed results and recommendations with patient. Questions were addressed and the patient was encouraged to contact our department should concerns arise.        Mindy Phipps, CCC-A, F-AAA  Doctor of Audiology

## 2025-03-26 NOTE — PROGRESS NOTES
YOB: 1975  Location: [facility]  Location Address: [address] [place] [address], [facility], [address] [place] [address]  Location Phone: [phone]    Chief Complaint   Patient presents with    Ear Problem       History of Present Illness  Sara Foy is a 49 y.o. female.  Sara Foy is here for follow up of ENT complaints. The patient has had problems with ear pain and ringing of the ears, reflux and snoring   Patient has been diagnosed with sleep apnea in the past but is unable to tolerate cpap at this time    AHI 8.4   She has a history of scc excision of the forehead in the past   At last visit cryotherapy was performed to skin lesion to left cheek, lesion has completely resolved at this time        Procedure visit with Mindy Leonard AUD (2025)       SLEEP STUDY EXTERNAL - SCAN - HOME SLEEP STUDY_[facility] SLEEP DIS CTR_24 (2024)   Past Medical History:   Diagnosis Date    Asthma     Chronic pain     Dental disease 2022    Right side of face and jaw constantly sore, ache a lot during covid and when sick.  tender. Cavity top right, back molar.    Dizziness     After getting Covid for the first time  I have had more than 5 or 6 random bouts of  extreme dizziness. And my right ear drum vibrates sometimes.    DVT (deep venous thrombosis)     GERD (gastroesophageal reflux disease)     Headache     I have severe scoliosis. Headaches are chronic. Worse when have ear infections.    High blood pressure     HL (hearing loss)     After getting Covid for first time. Sometimes ears itch.    Low back pain     Peptic ulcer     Scoliosis     Sinusitis     Not disease, but had basal cell skin carcinoma removed from left side nasal cavity area of my cheek. Had Mohs surgery to remove, was deep enough to see bone, and as wide as a quarter. 57 stitches. harder to breathe through ever since.  - had squamous    Skin lesion     Tinnitus     Have had through out my life. Also  suffered from chronic ear infections since early childhood. Both ears get frequent infections, right ear always worse.    TMJ dysfunction 2021    After Covid, right side of my face, jaw, ear have been very sore. Ache. I grind my teeth. I have chronic pain and fibromyalgia. Got worse after Covid.       Past Surgical History:   Procedure Laterality Date    FLAP HEAD/NECK N/A 03/10/2021    Procedure: possible flap or graft;  Surgeon: Andrea Ceja MD;  Location:  [facility] OR;  Service: ENT;  Laterality: N/A;    HEAD/NECK LESION/CYST EXCISION N/A 03/10/2021    Procedure: Excision of squamous cell carcinoma in situ of the of the forehead\glabella with transposition flap;  Surgeon: Andrea Ceja MD;  Location:  [facility] OR;  Service: ENT;  Laterality: N/A;    HYSTERECTOMY  2016    LEG SURGERY      SINUS SURGERY  2016/2022    Not sinus surgery - deep and wide Basal Cell removed from left cheek/nasal cavity area. 2021 - Squamous cell removed from inbetween eyebrows.    VASCULAR SURGERY         Outpatient Medications Marked as Taking for the 3/26/25 encounter (Office Visit) with Amber Sanchez APRN   Medication Sig Dispense Refill    albuterol sulfate  (90 Base) MCG/ACT inhaler Inhale 2 puffs 4 (Four) Times a Day.      aspirin 81 MG EC tablet Take 1 tablet every week by oral route for 90 days.      azelastine (ASTELIN) 0.1 % nasal spray 2 sprays into the nostril(s) as directed by provider 2 (Two) Times a Day. Use in each nostril as directed 30 mL 11    buPROPion XL (WELLBUTRIN XL) 150 MG 24 hr tablet Take 1 tablet by mouth Every Morning.      celecoxib (CeleBREX) 50 MG capsule Take 1 capsule by mouth.      cyclobenzaprine (FLEXERIL) 10 MG tablet As Needed.      Diclofenac Sodium 3 % gel gel       ergocalciferol (ERGOCALCIFEROL) 1.25 MG (63682 UT) capsule Take 1 capsule by mouth 1 (One) Time Per Week.      fluticasone (FLONASE) 50 MCG/ACT nasal spray Administer 2 sprays into the nostril(s) as directed by  provider Daily As Needed.      Hyoscyamine Sulfate SL 0.125 MG sublingual tablet Place 1 tablet every 4-6 hours by sublingual route as needed.      ibuprofen (ADVIL,MOTRIN) 200 MG tablet Take 4 tablets every 6 hours by oral route as needed.      loratadine-pseudoephedrine (CLARITIN-D 12-hour) 5-120 MG per 12 hr tablet Take 1 tablet every 12 hours by oral route as needed.      LORazepam (ATIVAN) 1 MG tablet Take 1 tablet by mouth At Night As Needed for Anxiety.      methocarbamol (ROBAXIN) 500 MG tablet methocarbamol 500 mg tablet   Take 1 tablet twice a day by oral route as needed.      multivitamin (THERAGRAN) tablet tablet Take 1 tablet by mouth Daily.      omeprazole (priLOSEC) 40 MG capsule Take 1 capsule by mouth Daily As Needed.      Sucralfate (CARAFATE PO) Take 1 g by mouth Daily As Needed.      VITAMIN D, CHOLECALCIFEROL, PO Take 1 tablet by mouth Daily.      Xifaxan 550 MG tablet Take 1 tablet by mouth 3 times a day.         Penicillins, Duloxetine hcl, Doxycycline, Iodinated contrast media, Levaquin [levofloxacin], and Contrast dye (echo or unknown ct/mr)    Family History   Problem Relation Age of Onset    Rheum arthritis Mother     Osteoarthritis Mother     Stroke Mother         Has DVTs and blood clotting disorders    Cancer Father         Has extremely rare form of skin cancer and prostate cancer    Rashes / Skin problems Father         Extremely rare form of skin cancer. Under going cancer treatment in [facility]. Biopsies sent to [place] and MD Fernandez inconclusive,  but have determined it a cancer    Cancer Sister     Cancer Sister     Asthma Sister        Social History     Socioeconomic History    Marital status:    Tobacco Use    Smoking status: Former     Current packs/day: 0.00     Average packs/day: 1 pack/day for 15.0 years (15.0 ttl pk-yrs)     Types: Cigarettes     Start date: 1992     Quit date: 2006     Years since quittin.8    Smokeless tobacco: Never   Vaping  Use    Vaping status: Never Used   Substance and Sexual Activity    Alcohol use: Yes     Alcohol/week: 1.0 standard drink of alcohol     Types: 1 Glasses of wine per week     Comment: Socially - 2 or 3 times a month    Drug use: Not Currently     Types: Marijuana     Comment: no recent substance abuse, only in her teens    Sexual activity: Defer       Review of Systems   Constitutional: Negative.    HENT:  Positive for tinnitus.    Skin:         Admits skin lesion       There were no vitals filed for this visit.    Body mass index is 39.41 kg/m².    Objective     Physical Exam  Vitals reviewed.   Constitutional:       Appearance: She is obese.   HENT:      Head: Normocephalic.        Right Ear: Tympanic membrane, ear canal and external ear normal.      Left Ear: Tympanic membrane, ear canal and external ear normal.      Nose: Septal deviation present.      Mouth/Throat:      Lips: Pink.      Mouth: Mucous membranes are moist.      Comments: [place] 3  Neurological:      Mental Status: She is alert.         Assessment & Plan   Diagnoses and all orders for this visit:    1. Sensorineural hearing loss (SNHL) of both ears (Primary)    2. Tinnitus of both ears    3. Dizziness    4. Laryngopharyngeal reflux    5. Daytime somnolence      * Surgery not found *  No orders of the defined types were placed in this encounter.    Return in about 6 months (around 9/26/2025).     F/u in 6 months   Discussed possible causes of off balance sensation   Call for new/worsening concerns       There are no Patient Instructions on file for this visit.

## 2025-04-09 NOTE — PROGRESS NOTES
Patient: Olimpia Pierre   YOB: 1975  Age: 49 y.o.  Date: 2/6/2025       Chief Complaint   Patient presents with    Injections     Bilateral Knee        HPI:   bilateral Knee  Patient is a pleasant 49 y.o. female presenting to the clinic today for follow up on her bilateral knee osteoarthritis. She has been receiving steroid for quite some time which continues to provide her with meaningful relief. She is here today to have this updated.  She denies any new injury or trauma to the area.     Past Medical History:   Diagnosis Date    ADHD     Arthritis     spine    Asthma     Cancer (HCC) 2015,2021    skin    Celiac disease     Chronic pain     Depression with anxiety     Fibromyalgia     Fibromyositis 2018    GERD (gastroesophageal reflux disease)     IBS (irritable bowel syndrome)     Kyphosis     Osteoarthritis     Scoliosis     Spinal stenosis     Spondylolisthesis       Past Surgical History:   Procedure Laterality Date    COLONOSCOPY      2020    HYSTERECTOMY, VAGINAL      left one ovary      Family History   Problem Relation Age of Onset    Endometrial Cancer Mother     Clotting Disorder Mother         has DVT & TI's    Osteoporosis Mother     Rheumatologic Disease Mother     Colon Cancer Sister     Endometrial Cancer Sister     Cancer Sister         Colon - alive    Rheumatologic Disease Sister     Scoliosis Sister     Colon Cancer Maternal Uncle     Cancer Sister         Endometrial -Dead      Current Outpatient Medications   Medication Sig Dispense Refill    celecoxib (CELEBREX) 50 MG capsule Take 1 capsule by mouth 2 times daily      buPROPion (WELLBUTRIN XL) 150 MG extended release tablet Take 1 tablet by mouth every morning 30 tablet 3    progesterone (PROMETRIUM) 100 MG CAPS capsule Take 1 capsule by mouth nightly 30 capsule 2    spironolactone (ALDACTONE) 50 MG tablet Take 1 tablet by mouth daily 30 tablet 5    sucralfate (CARAFATE) 1 GM tablet Take 1 tablet by mouth daily as needed      
[id number]

## 2025-05-07 ENCOUNTER — HOSPITAL ENCOUNTER (OUTPATIENT)
Dept: MRI IMAGING | Age: 50
Discharge: HOME | End: 2025-05-07
Payer: MEDICAID

## 2025-05-07 DIAGNOSIS — R20.2 NUMBNESS AND TINGLING: ICD-10-CM

## 2025-05-07 DIAGNOSIS — R20.0 NUMBNESS AND TINGLING: ICD-10-CM

## 2025-05-07 PROCEDURE — 72141 MRI NECK SPINE W/O DYE: CPT

## 2025-05-07 PROCEDURE — 72148 MRI LUMBAR SPINE W/O DYE: CPT

## 2025-05-07 PROCEDURE — 72146 MRI CHEST SPINE W/O DYE: CPT

## 2025-05-08 ENCOUNTER — RESULTS FOLLOW-UP (OUTPATIENT)
Dept: NEUROLOGY | Age: 50
End: 2025-05-08

## 2025-05-08 DIAGNOSIS — E04.1 THYROID NODULE: Primary | ICD-10-CM

## 2025-05-13 NOTE — TELEPHONE ENCOUNTER
Can you call radiology and have them look at this? There is something about thyroid nodules in the thoracic spine report which does not make sense.

## 2025-05-13 NOTE — TELEPHONE ENCOUNTER
I'm not sure what she is referring to about nodules? I looked at all her MRIs again and there is only concern about the scoliosis leading to NF narrowing. Referral is in for Dr. Ross

## 2025-06-03 ENCOUNTER — HOSPITAL ENCOUNTER (OUTPATIENT)
Dept: ULTRASOUND IMAGING | Age: 50
Discharge: HOME | End: 2025-06-03
Payer: MEDICAID

## 2025-06-03 DIAGNOSIS — E04.1 THYROID NODULE: ICD-10-CM

## 2025-06-03 PROCEDURE — 76536 US EXAM OF HEAD AND NECK: CPT

## 2025-06-11 ENCOUNTER — RESULTS FOLLOW-UP (OUTPATIENT)
Dept: NEUROLOGY | Age: 50
End: 2025-06-11

## 2025-06-11 DIAGNOSIS — E04.1 THYROID NODULE: Primary | ICD-10-CM

## 2025-06-19 ENCOUNTER — OFFICE VISIT (OUTPATIENT)
Dept: ENT CLINIC | Age: 50
End: 2025-06-19
Payer: MEDICAID

## 2025-06-19 VITALS — WEIGHT: 230 LBS | BODY MASS INDEX: 39.48 KG/M2 | DIASTOLIC BLOOD PRESSURE: 77 MMHG | SYSTOLIC BLOOD PRESSURE: 122 MMHG

## 2025-06-19 DIAGNOSIS — K21.9 LARYNGOPHARYNGEAL REFLUX: ICD-10-CM

## 2025-06-19 DIAGNOSIS — R09.A2 GLOBUS SENSATION: ICD-10-CM

## 2025-06-19 DIAGNOSIS — R13.10 DYSPHAGIA, UNSPECIFIED TYPE: ICD-10-CM

## 2025-06-19 DIAGNOSIS — E04.2 MULTIPLE THYROID NODULES: Primary | ICD-10-CM

## 2025-06-19 PROCEDURE — 1036F TOBACCO NON-USER: CPT | Performed by: NURSE PRACTITIONER

## 2025-06-19 PROCEDURE — G8427 DOCREV CUR MEDS BY ELIG CLIN: HCPCS | Performed by: NURSE PRACTITIONER

## 2025-06-19 PROCEDURE — G8417 CALC BMI ABV UP PARAM F/U: HCPCS | Performed by: NURSE PRACTITIONER

## 2025-06-19 PROCEDURE — 99204 OFFICE O/P NEW MOD 45 MIN: CPT | Performed by: NURSE PRACTITIONER

## 2025-06-19 PROCEDURE — 31575 DIAGNOSTIC LARYNGOSCOPY: CPT | Performed by: NURSE PRACTITIONER

## 2025-06-19 RX ORDER — FAMOTIDINE 20 MG/1
20 TABLET, FILM COATED ORAL NIGHTLY
Qty: 30 TABLET | Refills: 1 | Status: SHIPPED | OUTPATIENT
Start: 2025-06-19

## 2025-06-19 ASSESSMENT — ENCOUNTER SYMPTOMS
ALLERGIC/IMMUNOLOGIC NEGATIVE: 1
TROUBLE SWALLOWING: 1
EYES NEGATIVE: 1
RESPIRATORY NEGATIVE: 1
GASTROINTESTINAL NEGATIVE: 1

## 2025-06-19 NOTE — PROGRESS NOTES
2025    Olimpia Pierre (:  1975) is a 49 y.o. female, Established patient, here for evaluation of the following chief complaint(s):  New Patient (Thyroid nodules)      Vitals:    25 0827   BP: 122/77   BP Site: Left Upper Arm   Patient Position: Sitting   Weight: 104.3 kg (230 lb)       Wt Readings from Last 3 Encounters:   25 104.3 kg (230 lb)   25 108.4 kg (239 lb)   25 108.4 kg (239 lb)       BP Readings from Last 3 Encounters:   25 122/77   25 (!) 141/81   25 (!) 131/92         SUBJECTIVE/OBJECTIVE:    Patient seen today for her thyroid. The nodules were found incidentally through other imaging. She had a thyroid ultrasound done 6/3/25 that showed multiple nodules. She reports that when she swallows or sing her throat will feel like it starts to swell. She does report she feels like she can't swallow sometimes and she has some difficulty swallowing at times. She does complain of globus sensation. She does take omeprazole for reflux. She does have a history of IBS and celiac disease. She does report some drainage down her throat and takes Astelin for that.         Review of Systems   Constitutional: Negative.    HENT:  Positive for postnasal drip and trouble swallowing.    Eyes: Negative.    Respiratory: Negative.     Cardiovascular: Negative.    Gastrointestinal: Negative.    Endocrine: Negative.    Musculoskeletal: Negative.    Skin: Negative.    Allergic/Immunologic: Negative.    Neurological: Negative.    Hematological: Negative.    Psychiatric/Behavioral: Negative.          Physical Exam  Vitals reviewed.   Constitutional:       Appearance: Normal appearance. She is normal weight.   HENT:      Head: Normocephalic and atraumatic.      Right Ear: Tympanic membrane, ear canal and external ear normal.      Left Ear: Tympanic membrane, ear canal and external ear normal.      Nose: Nose normal.      Mouth/Throat:      Mouth: Mucous membranes are moist.

## 2025-06-24 ENCOUNTER — OFFICE VISIT (OUTPATIENT)
Dept: NEUROLOGY | Age: 50
End: 2025-06-24
Payer: MEDICAID

## 2025-06-24 VITALS
OXYGEN SATURATION: 98 % | HEIGHT: 64 IN | DIASTOLIC BLOOD PRESSURE: 79 MMHG | WEIGHT: 230 LBS | SYSTOLIC BLOOD PRESSURE: 123 MMHG | BODY MASS INDEX: 39.27 KG/M2 | HEART RATE: 65 BPM

## 2025-06-24 DIAGNOSIS — M62.838 MUSCLE SPASM: ICD-10-CM

## 2025-06-24 DIAGNOSIS — M54.2 NECK PAIN: ICD-10-CM

## 2025-06-24 DIAGNOSIS — R20.2 NUMBNESS AND TINGLING: ICD-10-CM

## 2025-06-24 DIAGNOSIS — R20.0 NUMBNESS AND TINGLING: ICD-10-CM

## 2025-06-24 DIAGNOSIS — M54.50 CHRONIC BILATERAL LOW BACK PAIN WITHOUT SCIATICA: ICD-10-CM

## 2025-06-24 DIAGNOSIS — M41.9 SCOLIOSIS, UNSPECIFIED SCOLIOSIS TYPE, UNSPECIFIED SPINAL REGION: Primary | ICD-10-CM

## 2025-06-24 DIAGNOSIS — G89.29 CHRONIC BILATERAL LOW BACK PAIN WITHOUT SCIATICA: ICD-10-CM

## 2025-06-24 PROCEDURE — G8417 CALC BMI ABV UP PARAM F/U: HCPCS | Performed by: NURSE PRACTITIONER

## 2025-06-24 PROCEDURE — 1036F TOBACCO NON-USER: CPT | Performed by: NURSE PRACTITIONER

## 2025-06-24 PROCEDURE — G8427 DOCREV CUR MEDS BY ELIG CLIN: HCPCS | Performed by: NURSE PRACTITIONER

## 2025-06-24 PROCEDURE — 99213 OFFICE O/P EST LOW 20 MIN: CPT | Performed by: NURSE PRACTITIONER

## 2025-06-24 NOTE — PROGRESS NOTES
[facility] Office Note      Patient:   Olimpia Pierre  MR#:    [id number]  Account Number:                         YOB: 1975  Date of Evaluation:  6/24/2025  Time of Note:                          3:53 PM  Primary/Referring Physician:  Anna Bryan APRN - CNP   Consulting Physician:  Patsy Benjamin DNP, HUONG    FOLLOW UP    Chief Complaint   Patient presents with    Follow-up    Numbness     Pt states things are much better. Still noting numbness but spasms are better    Results     History of Present Illness  The patient presents for evaluation of muscle spasms, neck/back pain, numbness/tingling, history of fibromyalgia.     She reports an improvement in muscle spasms, attributing this to the intake of B12. The most severe spasm occurs in the mid-thoracic area, extending under the breast and to the sternum area. She notes more mild spasms in BUE, BLE.  Tizanidine is beneficial.  Continues to note neck, thoracic and lumbar spine pain.  No clear radicular pain into her extremities.  MRIs completed, scoliosis noted.  She has seen Dr. Ross in the past regarding her scoliosis, not felt to be a good surgical candidate per patient.  Currently, she uses a brace for neck support, which she finds beneficial, and is considering the use of a ScoliBrace. Numbness and tingling sensations persist, varying in location.  Notes an intermittent buzzing sensation in her muscles.  Due to associated pain, she is not undergoing physical therapy. Her ability to work full-time is compromised due to her condition. Several injections for her SI joint have provided some relief.  She follows with vascular surgery in [place] for PVD.  She denies SI/HI today.    INTERVAL: Since the last visit, she reports improvement in muscle spasms with B12 injections.  She is seeing a therapist for complex PTSD, therapy has been quite helpful for her.         Past Medical History:   Diagnosis Date    ADHD     Arthritis

## 2025-07-17 ENCOUNTER — HOSPITAL ENCOUNTER (OUTPATIENT)
Dept: ULTRASOUND IMAGING | Age: 50
Discharge: HOME | End: 2025-07-17
Payer: MEDICAID

## 2025-07-17 DIAGNOSIS — E04.2 MULTIPLE THYROID NODULES: ICD-10-CM

## 2025-07-17 PROCEDURE — 88173 CYTOPATH EVAL FNA REPORT: CPT

## 2025-07-17 PROCEDURE — C1729 CATH, DRAINAGE: HCPCS

## 2025-07-24 DIAGNOSIS — E04.2 MULTIPLE THYROID NODULES: Primary | ICD-10-CM

## 2025-07-29 ENCOUNTER — OFFICE VISIT (OUTPATIENT)
Dept: ENT CLINIC | Age: 50
End: 2025-07-29
Payer: MEDICAID

## 2025-07-29 VITALS
BODY MASS INDEX: 39.44 KG/M2 | DIASTOLIC BLOOD PRESSURE: 90 MMHG | HEIGHT: 64 IN | WEIGHT: 231 LBS | SYSTOLIC BLOOD PRESSURE: 140 MMHG

## 2025-07-29 DIAGNOSIS — E04.2 MULTIPLE THYROID NODULES: ICD-10-CM

## 2025-07-29 DIAGNOSIS — R13.10 DYSPHAGIA, UNSPECIFIED TYPE: ICD-10-CM

## 2025-07-29 DIAGNOSIS — E04.2 MULTIPLE THYROID NODULES: Primary | ICD-10-CM

## 2025-07-29 DIAGNOSIS — R09.A2 GLOBUS SENSATION: ICD-10-CM

## 2025-07-29 DIAGNOSIS — K21.9 LARYNGOPHARYNGEAL REFLUX: ICD-10-CM

## 2025-07-29 LAB
T4 FREE SERPL-MCNC: 0.98 NG/DL (ref 0.93–1.7)
TSH SERPL DL<=0.005 MIU/L-ACNC: 1.25 UIU/ML (ref 0.27–4.2)

## 2025-07-29 PROCEDURE — G8427 DOCREV CUR MEDS BY ELIG CLIN: HCPCS | Performed by: NURSE PRACTITIONER

## 2025-07-29 PROCEDURE — G8417 CALC BMI ABV UP PARAM F/U: HCPCS | Performed by: NURSE PRACTITIONER

## 2025-07-29 PROCEDURE — 1036F TOBACCO NON-USER: CPT | Performed by: NURSE PRACTITIONER

## 2025-07-29 PROCEDURE — 99213 OFFICE O/P EST LOW 20 MIN: CPT | Performed by: NURSE PRACTITIONER

## 2025-07-29 RX ORDER — DICYCLOMINE HYDROCHLORIDE 10 MG/1
10 CAPSULE ORAL
COMMUNITY

## 2025-07-29 ASSESSMENT — ENCOUNTER SYMPTOMS
RESPIRATORY NEGATIVE: 1
EYES NEGATIVE: 1
ALLERGIC/IMMUNOLOGIC NEGATIVE: 1
GASTROINTESTINAL NEGATIVE: 1
TROUBLE SWALLOWING: 1

## 2025-07-29 NOTE — PROGRESS NOTES
2025    Olimpia Pierre (:  1975) is a 49 y.o. female, Established patient, here for evaluation of the following chief complaint(s):  Follow-up (Thyroid, LPR)      Vitals:    25 1331   BP: (!) 140/90   Weight: 104.8 kg (231 lb)   Height: 1.626 m (5' 4\")       Wt Readings from Last 3 Encounters:   25 104.8 kg (231 lb)   25 104.3 kg (230 lb)   25 104.3 kg (230 lb)       BP Readings from Last 3 Encounters:   25 (!) 140/90   25 123/79   25 122/77         SUBJECTIVE/OBJECTIVE:    Patient seen today for follow up of her thyroid and reflux. She was started on famotidine at her last visit. Her second thyroid FNA biopsy is scheduled for . Her first biopsy came back bethesda category 2. She is interested in having her thyroid labs done. She reports she did not start the famotidine due to being on other medications for her stomach. She does still feel like her throat swells when she swallows or sings. She does still report trouble swallowing. She is on omeprazole.         Review of Systems   Constitutional: Negative.    HENT:  Positive for trouble swallowing.    Eyes: Negative.    Respiratory: Negative.     Cardiovascular: Negative.    Gastrointestinal: Negative.    Endocrine: Negative.    Musculoskeletal: Negative.    Skin: Negative.    Allergic/Immunologic: Negative.    Neurological: Negative.    Hematological: Negative.    Psychiatric/Behavioral: Negative.          Physical Exam  Vitals reviewed.   Constitutional:       Appearance: Normal appearance. She is normal weight.   HENT:      Head: Normocephalic and atraumatic.      Right Ear: Tympanic membrane, ear canal and external ear normal.      Left Ear: Tympanic membrane, ear canal and external ear normal.      Nose: Nose normal.      Mouth/Throat:      Mouth: Mucous membranes are moist.      Dentition: Abnormal dentition.      Pharynx: Oropharynx is clear.   Eyes:      Extraocular Movements: Extraocular

## 2025-07-31 LAB
T3 SERPL-MCNC: 175 NG/DL (ref 80–200)
THYROPEROXIDASE AB SERPL-ACNC: 0.9 IU/ML (ref 0–9)

## 2025-08-12 ENCOUNTER — TELEMEDICINE (OUTPATIENT)
Dept: OBGYN CLINIC | Age: 50
End: 2025-08-12
Payer: MEDICAID

## 2025-08-12 ENCOUNTER — OFFICE VISIT (OUTPATIENT)
Dept: GASTROENTEROLOGY | Age: 50
End: 2025-08-12
Payer: MEDICAID

## 2025-08-12 VITALS
WEIGHT: 236 LBS | DIASTOLIC BLOOD PRESSURE: 80 MMHG | OXYGEN SATURATION: 99 % | SYSTOLIC BLOOD PRESSURE: 115 MMHG | BODY MASS INDEX: 40.29 KG/M2 | HEIGHT: 64 IN | HEART RATE: 87 BPM

## 2025-08-12 DIAGNOSIS — R23.2 HOT FLASHES: ICD-10-CM

## 2025-08-12 DIAGNOSIS — R61 NIGHT SWEATS: ICD-10-CM

## 2025-08-12 DIAGNOSIS — L65.8 FEMALE PATTERN HAIR LOSS: ICD-10-CM

## 2025-08-12 DIAGNOSIS — Z91.014 ALPHA-GAL SYNDROME: ICD-10-CM

## 2025-08-12 DIAGNOSIS — R13.10 DYSPHAGIA, UNSPECIFIED TYPE: Primary | ICD-10-CM

## 2025-08-12 DIAGNOSIS — R23.2 HOT FLASHES: Primary | ICD-10-CM

## 2025-08-12 DIAGNOSIS — R11.0 NAUSEA: ICD-10-CM

## 2025-08-12 LAB
ESTRADIOL SERPL-SCNC: 76.3 PG/ML
FSH SERPL-SCNC: 48.4 MIU/ML
PROGEST SERPL-MCNC: 0.45 NG/ML
TESTOST SERPL-MCNC: 6 NG/DL (ref 8.4–48.1)

## 2025-08-12 PROCEDURE — G8417 CALC BMI ABV UP PARAM F/U: HCPCS | Performed by: NURSE PRACTITIONER

## 2025-08-12 PROCEDURE — 1036F TOBACCO NON-USER: CPT

## 2025-08-12 PROCEDURE — 99214 OFFICE O/P EST MOD 30 MIN: CPT

## 2025-08-12 PROCEDURE — G8427 DOCREV CUR MEDS BY ELIG CLIN: HCPCS | Performed by: NURSE PRACTITIONER

## 2025-08-12 PROCEDURE — G8427 DOCREV CUR MEDS BY ELIG CLIN: HCPCS

## 2025-08-12 PROCEDURE — 99214 OFFICE O/P EST MOD 30 MIN: CPT | Performed by: NURSE PRACTITIONER

## 2025-08-12 PROCEDURE — 1036F TOBACCO NON-USER: CPT | Performed by: NURSE PRACTITIONER

## 2025-08-12 PROCEDURE — G8417 CALC BMI ABV UP PARAM F/U: HCPCS

## 2025-08-12 RX ORDER — ONDANSETRON 4 MG/1
4 TABLET, ORALLY DISINTEGRATING ORAL 3 TIMES DAILY PRN
Qty: 30 TABLET | Refills: 2 | Status: SHIPPED | OUTPATIENT
Start: 2025-08-12

## 2025-08-12 ASSESSMENT — ENCOUNTER SYMPTOMS
DIARRHEA: 0
GASTROINTESTINAL NEGATIVE: 1
CHEST TIGHTNESS: 0
NAUSEA: 0
ABDOMINAL PAIN: 0
SHORTNESS OF BREATH: 0
CONSTIPATION: 0
BACK PAIN: 0
RESPIRATORY NEGATIVE: 1
RECTAL PAIN: 0

## 2025-08-14 ASSESSMENT — ENCOUNTER SYMPTOMS
NAUSEA: 1
DIARRHEA: 0
BLOOD IN STOOL: 0
VOMITING: 0
CONSTIPATION: 0
ANAL BLEEDING: 0
TROUBLE SWALLOWING: 1
ABDOMINAL DISTENTION: 0
SHORTNESS OF BREATH: 0
CHOKING: 0
COUGH: 0
ABDOMINAL PAIN: 0
RECTAL PAIN: 0

## 2025-08-21 ENCOUNTER — HOSPITAL ENCOUNTER (OUTPATIENT)
Dept: ULTRASOUND IMAGING | Age: 50
Discharge: HOME | End: 2025-08-21
Payer: MEDICAID

## 2025-08-21 ENCOUNTER — TELEPHONE (OUTPATIENT)
Dept: ENT CLINIC | Age: 50
End: 2025-08-21

## 2025-08-21 DIAGNOSIS — E04.2 MULTIPLE THYROID NODULES: ICD-10-CM

## 2025-08-21 DIAGNOSIS — E04.2 MULTIPLE THYROID NODULES: Primary | ICD-10-CM

## 2025-08-21 PROCEDURE — 88305 TISSUE EXAM BY PATHOLOGIST: CPT

## 2025-08-21 PROCEDURE — C1729 CATH, DRAINAGE: HCPCS

## 2025-08-21 PROCEDURE — 88173 CYTOPATH EVAL FNA REPORT: CPT

## 2025-08-22 ENCOUNTER — TELEPHONE (OUTPATIENT)
Dept: ENT CLINIC | Age: 50
End: 2025-08-22

## 2025-08-25 ENCOUNTER — TELEPHONE (OUTPATIENT)
Dept: ENT CLINIC | Age: 50
End: 2025-08-25

## 2025-08-25 ENCOUNTER — TELEPHONE (OUTPATIENT)
Dept: GASTROENTEROLOGY | Age: 50
End: 2025-08-25

## 2025-08-26 ENCOUNTER — TELEPHONE (OUTPATIENT)
Dept: ENT CLINIC | Age: 50
End: 2025-08-26

## 2025-08-28 ENCOUNTER — ANCILLARY PROCEDURE (OUTPATIENT)
Dept: ENDOSCOPY | Age: 50
End: 2025-08-28
Attending: INTERNAL MEDICINE
Payer: MEDICAID

## 2025-08-28 ENCOUNTER — ANESTHESIA (OUTPATIENT)
Dept: ENDOSCOPY | Age: 50
End: 2025-08-28
Payer: MEDICAID

## 2025-08-28 ENCOUNTER — ANESTHESIA EVENT (OUTPATIENT)
Dept: ENDOSCOPY | Age: 50
End: 2025-08-28
Payer: MEDICAID

## 2025-08-28 ENCOUNTER — HOSPITAL ENCOUNTER (OUTPATIENT)
Age: 50
Setting detail: OUTPATIENT SURGERY
Discharge: HOME | End: 2025-08-28
Attending: INTERNAL MEDICINE | Admitting: INTERNAL MEDICINE
Payer: MEDICAID

## 2025-08-28 VITALS
OXYGEN SATURATION: 100 % | DIASTOLIC BLOOD PRESSURE: 75 MMHG | RESPIRATION RATE: 18 BRPM | HEIGHT: 64 IN | HEART RATE: 52 BPM | WEIGHT: 238 LBS | SYSTOLIC BLOOD PRESSURE: 121 MMHG | BODY MASS INDEX: 40.63 KG/M2 | TEMPERATURE: 97.7 F

## 2025-08-28 DIAGNOSIS — R13.10 DYSPHAGIA, UNSPECIFIED TYPE: ICD-10-CM

## 2025-08-28 DIAGNOSIS — R11.0 NAUSEA: ICD-10-CM

## 2025-08-28 PROBLEM — Z87.891 HISTORY OF TOBACCO ABUSE: Status: ACTIVE | Noted: 2025-08-28

## 2025-08-28 PROBLEM — Z91.014 ALPHA-GAL SYNDROME: Status: ACTIVE | Noted: 2025-08-28

## 2025-08-28 PROCEDURE — 88305 TISSUE EXAM BY PATHOLOGIST: CPT

## 2025-08-28 PROCEDURE — 3609015300 HC ESOPHAGEAL DILATION MALONEY: Performed by: INTERNAL MEDICINE

## 2025-08-28 PROCEDURE — 3700000001 HC ADD 15 MINUTES (ANESTHESIA): Performed by: INTERNAL MEDICINE

## 2025-08-28 PROCEDURE — 3700000000 HC ANESTHESIA ATTENDED CARE: Performed by: INTERNAL MEDICINE

## 2025-08-28 PROCEDURE — 2709999900 HC NON-CHARGEABLE SUPPLY: Performed by: INTERNAL MEDICINE

## 2025-08-28 PROCEDURE — 7100000010 HC PHASE II RECOVERY - FIRST 15 MIN: Performed by: INTERNAL MEDICINE

## 2025-08-28 PROCEDURE — 88305 TISSUE EXAM BY PATHOLOGIST: CPT | Performed by: PATHOLOGY

## 2025-08-28 PROCEDURE — 2580000003 HC RX 258: Performed by: ANESTHESIOLOGY

## 2025-08-28 PROCEDURE — 3609012400 HC EGD TRANSORAL BIOPSY SINGLE/MULTIPLE: Performed by: INTERNAL MEDICINE

## 2025-08-28 PROCEDURE — 7100000011 HC PHASE II RECOVERY - ADDTL 15 MIN: Performed by: INTERNAL MEDICINE

## 2025-08-28 PROCEDURE — 6360000002 HC RX W HCPCS: Performed by: NURSE ANESTHETIST, CERTIFIED REGISTERED

## 2025-08-28 PROCEDURE — 43450 DILATE ESOPHAGUS 1/MULT PASS: CPT | Performed by: INTERNAL MEDICINE

## 2025-08-28 PROCEDURE — 43239 EGD BIOPSY SINGLE/MULTIPLE: CPT | Performed by: INTERNAL MEDICINE

## 2025-08-28 RX ORDER — FENTANYL CITRATE 50 UG/ML
INJECTION, SOLUTION INTRAMUSCULAR; INTRAVENOUS
Status: DISCONTINUED | OUTPATIENT
Start: 2025-08-28 | End: 2025-08-28 | Stop reason: SDUPTHER

## 2025-08-28 RX ORDER — PROPOFOL 10 MG/ML
INJECTION, EMULSION INTRAVENOUS
Status: DISCONTINUED | OUTPATIENT
Start: 2025-08-28 | End: 2025-08-28 | Stop reason: SDUPTHER

## 2025-08-28 RX ORDER — GLYCOPYRROLATE 0.2 MG/ML
INJECTION INTRAMUSCULAR; INTRAVENOUS
Status: DISCONTINUED | OUTPATIENT
Start: 2025-08-28 | End: 2025-08-28 | Stop reason: SDUPTHER

## 2025-08-28 RX ORDER — SODIUM CHLORIDE, SODIUM LACTATE, POTASSIUM CHLORIDE, CALCIUM CHLORIDE 600; 310; 30; 20 MG/100ML; MG/100ML; MG/100ML; MG/100ML
INJECTION, SOLUTION INTRAVENOUS CONTINUOUS
Status: DISCONTINUED | OUTPATIENT
Start: 2025-08-28 | End: 2025-08-28 | Stop reason: HOSPADM

## 2025-08-28 RX ORDER — LIDOCAINE HYDROCHLORIDE 10 MG/ML
INJECTION, SOLUTION EPIDURAL; INFILTRATION; INTRACAUDAL; PERINEURAL
Status: DISCONTINUED | OUTPATIENT
Start: 2025-08-28 | End: 2025-08-28 | Stop reason: SDUPTHER

## 2025-08-28 RX ADMIN — LIDOCAINE HYDROCHLORIDE 50 MG: 10 INJECTION, SOLUTION EPIDURAL; INFILTRATION; INTRACAUDAL; PERINEURAL at 08:30

## 2025-08-28 RX ADMIN — FENTANYL CITRATE 50 MCG: 50 INJECTION INTRAMUSCULAR; INTRAVENOUS at 08:30

## 2025-08-28 RX ADMIN — SODIUM CHLORIDE, SODIUM LACTATE, POTASSIUM CHLORIDE, AND CALCIUM CHLORIDE: .6; .31; .03; .02 INJECTION, SOLUTION INTRAVENOUS at 07:51

## 2025-08-28 RX ADMIN — PROPOFOL 250 MG: 10 INJECTION, EMULSION INTRAVENOUS at 08:30

## 2025-08-28 RX ADMIN — GLYCOPYRROLATE 0.2 MG: 0.2 INJECTION, SOLUTION INTRAMUSCULAR; INTRAVENOUS at 08:33

## 2025-08-28 ASSESSMENT — PAIN - FUNCTIONAL ASSESSMENT
PAIN_FUNCTIONAL_ASSESSMENT: 0-10

## (undated) DEVICE — GLV SURG BIOGEL M LTX PF 7 1/2

## (undated) DEVICE — SUT ETHLN 5/0 P3 18IN 698H

## (undated) DEVICE — SPNG GZ WOVN 4X4IN 12PLY 10/BX STRL

## (undated) DEVICE — SUT MNCRYL 4/0 P3 18IN UD MCP494G

## (undated) DEVICE — PK ENT HD AND NK 30

## (undated) DEVICE — ENDO KT LOURDES HOSP

## (undated) DEVICE — PREP SOL POVIDONE/IODINE BT 4OZ

## (undated) DEVICE — PK TURNOVER RM ADV

## (undated) DEVICE — FORCEPS BX L240CM JAW DIA2.4MM ORNG L CAP W/ NDL DISP RAD

## (undated) DEVICE — SUT ETHLN 6/0 P3 18IN 1698G